# Patient Record
Sex: MALE | Race: WHITE | NOT HISPANIC OR LATINO | URBAN - NONMETROPOLITAN AREA
[De-identification: names, ages, dates, MRNs, and addresses within clinical notes are randomized per-mention and may not be internally consistent; named-entity substitution may affect disease eponyms.]

---

## 2021-09-09 ENCOUNTER — OFFICE (OUTPATIENT)
Dept: URBAN - NONMETROPOLITAN AREA CLINIC 13 | Facility: CLINIC | Age: 61
End: 2021-09-09

## 2021-09-09 VITALS
HEART RATE: 76 BPM | WEIGHT: 315 LBS | DIASTOLIC BLOOD PRESSURE: 76 MMHG | HEIGHT: 74 IN | OXYGEN SATURATION: 95 % | SYSTOLIC BLOOD PRESSURE: 166 MMHG | SYSTOLIC BLOOD PRESSURE: 176 MMHG

## 2021-09-09 DIAGNOSIS — Z86.010 PERSONAL HISTORY OF COLONIC POLYPS: ICD-10-CM

## 2021-09-09 DIAGNOSIS — D50.9 IRON DEFICIENCY ANEMIA, UNSPECIFIED: ICD-10-CM

## 2021-09-09 DIAGNOSIS — K21.9 GASTRO-ESOPHAGEAL REFLUX DISEASE WITHOUT ESOPHAGITIS: ICD-10-CM

## 2021-09-09 DIAGNOSIS — K92.1 MELENA: ICD-10-CM

## 2021-09-09 PROCEDURE — 99204 OFFICE O/P NEW MOD 45 MIN: CPT | Performed by: NURSE PRACTITIONER

## 2021-09-10 LAB
CBC WITH WBC (DIFF): ACANTHOCYTE: NORMAL
CBC WITH WBC (DIFF): AGRANULAR NEUTROPHIL: NORMAL
CBC WITH WBC (DIFF): ANISOCYTOSIS: NORMAL
CBC WITH WBC (DIFF): ATYPICAL LYMPHOCYTE: NORMAL
CBC WITH WBC (DIFF): AUER RODS: NORMAL
CBC WITH WBC (DIFF): BAND: NORMAL
CBC WITH WBC (DIFF): BASOPHIL: NORMAL
CBC WITH WBC (DIFF): BASOPHILIC STIPPLING: NORMAL
CBC WITH WBC (DIFF): BI-LOBED NEUTROPHIL: NORMAL
CBC WITH WBC (DIFF): BLAST: NORMAL
CBC WITH WBC (DIFF): BURR CELL: NORMAL
CBC WITH WBC (DIFF): DIMORPHIC RBC POPULATION: NORMAL
CBC WITH WBC (DIFF): DOHLE BODIES: NORMAL
CBC WITH WBC (DIFF): ELLIPTOCYTE: NORMAL
CBC WITH WBC (DIFF): EOSINOPHIL: NORMAL
CBC WITH WBC (DIFF): GIANT PLATELET: NORMAL
CBC WITH WBC (DIFF): HEMATOCRIT: 31.9 % — LOW (ref 41–53)
CBC WITH WBC (DIFF): HEMOGLOBIN: 8.2 G/DL — LOW (ref 14–18)
CBC WITH WBC (DIFF): HOWELL JOLLY BODIES: NORMAL
CBC WITH WBC (DIFF): HYPERSEGMENTED NEUTROPHIL: NORMAL
CBC WITH WBC (DIFF): HYPOCHROMIA: NORMAL
CBC WITH WBC (DIFF): HYPOGRANULAR NEUTROPHIL: NORMAL
CBC WITH WBC (DIFF): IMMATURE GRANULOCYTES: NORMAL
CBC WITH WBC (DIFF): LARGE PLATELET: NORMAL
CBC WITH WBC (DIFF): LYMPHOCYTE: NORMAL
CBC WITH WBC (DIFF): MACROCYTOSIS: NORMAL
CBC WITH WBC (DIFF): MEAN CELL HEMOGLOBIN CONCENTRATION: 25.7 G/DL — LOW (ref 33–37)
CBC WITH WBC (DIFF): MEAN CELL HEMOGLOBIN: 21.2 PG — LOW (ref 27–31)
CBC WITH WBC (DIFF): MEAN CELL VOLUME: 82 FL — LOW (ref 84–100)
CBC WITH WBC (DIFF): MEAN PLATELET VOLUME: 10.8 FL (ref 8.3–11.9)
CBC WITH WBC (DIFF): METAMYELOCYTE: NORMAL
CBC WITH WBC (DIFF): MICROCYTOSIS: NORMAL
CBC WITH WBC (DIFF): MIX CELLS: NORMAL
CBC WITH WBC (DIFF): MONOCYTE: NORMAL
CBC WITH WBC (DIFF): MYELOCYTE: NORMAL
CBC WITH WBC (DIFF): NEUTROPHIL SEGMENTED: NORMAL
CBC WITH WBC (DIFF): NOTE: NORMAL
CBC WITH WBC (DIFF): NUCLEATED RBC: 0 /100WBC (ref 0–0)
CBC WITH WBC (DIFF): OVALOCYTE: NORMAL
CBC WITH WBC (DIFF): PAPPENHEIMER BODIES: NORMAL
CBC WITH WBC (DIFF): PATHOLOGIST INTERPRETATION: NORMAL
CBC WITH WBC (DIFF): PLATELET CLUMPS: NORMAL
CBC WITH WBC (DIFF): PLATELET COUNT: 259 /NL (ref 140–440)
CBC WITH WBC (DIFF): PLT SATELLITOSIS: NORMAL
CBC WITH WBC (DIFF): POIKILOCYTOSIS: NORMAL
CBC WITH WBC (DIFF): POLYCHROMASIA: NORMAL
CBC WITH WBC (DIFF): PROMYELOCYTE: NORMAL
CBC WITH WBC (DIFF): RBC MORPHOLOGY: NORMAL
CBC WITH WBC (DIFF): REACT LYMPH ABS MAN: NORMAL
CBC WITH WBC (DIFF): REACTIVE LYMPHOCYTE: NORMAL
CBC WITH WBC (DIFF): RED BLOOD CELL: 3.87 /PL — LOW (ref 4.7–6.1)
CBC WITH WBC (DIFF): RED CELL DIAMETER WIDTH: 52 FL — HIGH (ref 35–48)
CBC WITH WBC (DIFF): ROULEAUX RBC: NORMAL
CBC WITH WBC (DIFF): SCHISTOCYTE: NORMAL
CBC WITH WBC (DIFF): SICKLE CELL: NORMAL
CBC WITH WBC (DIFF): SMUDGE CELLS: NORMAL
CBC WITH WBC (DIFF): SPHEROCYTE: NORMAL
CBC WITH WBC (DIFF): STOMATOCYTE: NORMAL
CBC WITH WBC (DIFF): TARGET CELL: NORMAL
CBC WITH WBC (DIFF): TEAR DROP CELL: NORMAL
CBC WITH WBC (DIFF): TOXIC GRANULATION: NORMAL
CBC WITH WBC (DIFF): UNCLASSIFIED CELL: NORMAL
CBC WITH WBC (DIFF): VACUOLATED NEUTROPHIL: NORMAL
CBC WITH WBC (DIFF): WBC MORPHOLOGY: NORMAL
CBC WITH WBC (DIFF): WHITE BLOOD CELL: 6.8 /NL (ref 4.8–11)
COMPREHENSIVE METABOLIC PANEL: ALBUMIN: 4.2 G/DL (ref 3.5–4.8)
COMPREHENSIVE METABOLIC PANEL: ALKALINE PHOSPHATASE: 65 UNITS/L (ref 36–126)
COMPREHENSIVE METABOLIC PANEL: ALT: 14 UNITS/L (ref ?–49)
COMPREHENSIVE METABOLIC PANEL: ANION GAP: 12 MMOL/L (ref 7–16)
COMPREHENSIVE METABOLIC PANEL: AST: 21 UNITS/L (ref 17–59)
COMPREHENSIVE METABOLIC PANEL: BILIRUBIN, TOTAL: 0.6 MG/DL (ref 0.2–1.3)
COMPREHENSIVE METABOLIC PANEL: BUN/CREATININE RATIO: 18.3
COMPREHENSIVE METABOLIC PANEL: CALCIUM: 9.9 MG/DL (ref 8.4–10.2)
COMPREHENSIVE METABOLIC PANEL: CARBON DIOXIDE: 28 MMOL/L (ref 22–30)
COMPREHENSIVE METABOLIC PANEL: CHLORIDE: 101 MMOL/L (ref 98–107)
COMPREHENSIVE METABOLIC PANEL: CREATININE: 0.82 MG/DL (ref 0.66–1.25)
COMPREHENSIVE METABOLIC PANEL: GLUCOSE: 113 MG/DL — HIGH (ref 75–110)
COMPREHENSIVE METABOLIC PANEL: POTASSIUM: 4.6 MMOL/L (ref 3.5–5.3)
COMPREHENSIVE METABOLIC PANEL: PROTEIN, TOTAL, SERUM: 6.7 G/DL (ref 6.3–8.2)
COMPREHENSIVE METABOLIC PANEL: SODIUM: 141 MMOL/L (ref 137–145)
COMPREHENSIVE METABOLIC PANEL: UREA NITROGEN (BUN): 15 MG/DL (ref 9–20)
FERRITIN: 4.8 NG/ML — LOW (ref 18–464)
GFR: EGFR AFRICAN AMERICAN: >60 ML/MIN/1.73M2
GFR: EGFR NON-AFR.AMERICAN: >60 ML/MIN/1.73M2
IRON AND TOTAL IRON BINDING CAPACITY: % SATURATION: 5 % — LOW (ref 11–40)
IRON AND TOTAL IRON BINDING CAPACITY: IRON BINDING CAPACITY: 519 MCG/DL — HIGH (ref 261–462)
IRON AND TOTAL IRON BINDING CAPACITY: IRON, TOTAL: 24 MCG/DL — LOW (ref 49–181)

## 2021-09-22 ENCOUNTER — AMBULATORY SURGICAL CENTER (OUTPATIENT)
Dept: URBAN - NONMETROPOLITAN AREA SURGERY 2 | Facility: SURGERY | Age: 61
End: 2021-09-22

## 2021-09-22 ENCOUNTER — OFFICE (OUTPATIENT)
Dept: URBAN - NONMETROPOLITAN AREA CLINIC 13 | Facility: CLINIC | Age: 61
End: 2021-09-22

## 2021-09-22 VITALS
HEART RATE: 85 BPM | HEART RATE: 84 BPM | RESPIRATION RATE: 17 BRPM | HEART RATE: 82 BPM | DIASTOLIC BLOOD PRESSURE: 87 MMHG | DIASTOLIC BLOOD PRESSURE: 74 MMHG | OXYGEN SATURATION: 94 % | WEIGHT: 315 LBS | OXYGEN SATURATION: 98 % | HEART RATE: 72 BPM | OXYGEN SATURATION: 97 % | DIASTOLIC BLOOD PRESSURE: 93 MMHG | RESPIRATION RATE: 20 BRPM | DIASTOLIC BLOOD PRESSURE: 118 MMHG | HEIGHT: 74 IN | DIASTOLIC BLOOD PRESSURE: 75 MMHG | HEART RATE: 88 BPM | SYSTOLIC BLOOD PRESSURE: 134 MMHG | TEMPERATURE: 98.2 F | DIASTOLIC BLOOD PRESSURE: 95 MMHG | SYSTOLIC BLOOD PRESSURE: 161 MMHG | HEART RATE: 81 BPM | SYSTOLIC BLOOD PRESSURE: 147 MMHG | SYSTOLIC BLOOD PRESSURE: 144 MMHG | SYSTOLIC BLOOD PRESSURE: 173 MMHG | SYSTOLIC BLOOD PRESSURE: 162 MMHG | RESPIRATION RATE: 19 BRPM | SYSTOLIC BLOOD PRESSURE: 188 MMHG

## 2021-09-22 DIAGNOSIS — K31.89 OTHER DISEASES OF STOMACH AND DUODENUM: ICD-10-CM

## 2021-09-22 DIAGNOSIS — D50.9 IRON DEFICIENCY ANEMIA, UNSPECIFIED: ICD-10-CM

## 2021-09-22 DIAGNOSIS — K31.9 DISEASE OF STOMACH AND DUODENUM, UNSPECIFIED: ICD-10-CM

## 2021-09-22 DIAGNOSIS — K21.9 GASTRO-ESOPHAGEAL REFLUX DISEASE WITHOUT ESOPHAGITIS: ICD-10-CM

## 2021-09-22 DIAGNOSIS — K29.50 UNSPECIFIED CHRONIC GASTRITIS WITHOUT BLEEDING: ICD-10-CM

## 2021-09-22 DIAGNOSIS — K30 FUNCTIONAL DYSPEPSIA: ICD-10-CM

## 2021-09-22 PROCEDURE — 43239 EGD BIOPSY SINGLE/MULTIPLE: CPT | Performed by: INTERNAL MEDICINE

## 2021-09-22 PROCEDURE — 00731 ANES UPR GI NDSC PX NOS: CPT | Mod: QS,QZ

## 2021-09-22 PROCEDURE — 88313 SPECIAL STAINS GROUP 2: CPT | Mod: TC | Performed by: INTERNAL MEDICINE

## 2021-09-22 PROCEDURE — 00731 ANES UPR GI NDSC PX NOS: CPT | Mod: QZ,QS

## 2021-09-22 PROCEDURE — 88305 TISSUE EXAM BY PATHOLOGIST: CPT | Mod: TC | Performed by: INTERNAL MEDICINE

## 2021-09-22 PROCEDURE — 88312 SPECIAL STAINS GROUP 1: CPT | Mod: TC | Performed by: INTERNAL MEDICINE

## 2021-09-22 RX ORDER — OMEPRAZOLE 40 MG/1
CAPSULE, DELAYED RELEASE ORAL
Qty: 90 | Refills: 2 | Status: ACTIVE
Start: 2021-09-22

## 2021-09-22 RX ORDER — FAMOTIDINE 40 MG/1
TABLET, FILM COATED ORAL
Qty: 180 | Refills: 2 | Status: ACTIVE
Start: 2021-09-22

## 2021-09-24 LAB
PATHOLOGY REPORT: APSREPORT: (no result) 1
PATHOLOGY REPORT: PDF: (no result) 1

## 2021-11-23 ENCOUNTER — OFFICE (OUTPATIENT)
Dept: URBAN - NONMETROPOLITAN AREA CLINIC 13 | Facility: CLINIC | Age: 61
End: 2021-11-23

## 2021-11-23 VITALS
SYSTOLIC BLOOD PRESSURE: 149 MMHG | WEIGHT: 315 LBS | OXYGEN SATURATION: 97 % | HEIGHT: 74 IN | HEART RATE: 85 BPM | DIASTOLIC BLOOD PRESSURE: 68 MMHG

## 2021-11-23 DIAGNOSIS — K21.9 GASTRO-ESOPHAGEAL REFLUX DISEASE WITHOUT ESOPHAGITIS: ICD-10-CM

## 2021-11-23 DIAGNOSIS — D50.9 IRON DEFICIENCY ANEMIA, UNSPECIFIED: ICD-10-CM

## 2021-11-23 LAB
CBC WITH WBC (DIFF): ACANTHOCYTE: NORMAL
CBC WITH WBC (DIFF): AGRANULAR NEUTROPHIL: NORMAL
CBC WITH WBC (DIFF): ANISOCYTOSIS: (no result)
CBC WITH WBC (DIFF): ATYPICAL LYMPHOCYTE: NORMAL
CBC WITH WBC (DIFF): AUER RODS: NORMAL
CBC WITH WBC (DIFF): BAND: NORMAL
CBC WITH WBC (DIFF): BASOPHIL: 1 % (ref 0–1)
CBC WITH WBC (DIFF): BASOPHILIC STIPPLING: NORMAL
CBC WITH WBC (DIFF): BI-LOBED NEUTROPHIL: NORMAL
CBC WITH WBC (DIFF): BLAST: NORMAL
CBC WITH WBC (DIFF): BURR CELL: NORMAL
CBC WITH WBC (DIFF): DIMORPHIC RBC POPULATION: NORMAL
CBC WITH WBC (DIFF): DOHLE BODIES: NORMAL
CBC WITH WBC (DIFF): ELLIPTOCYTE: PRESENT
CBC WITH WBC (DIFF): EOSINOPHIL: 3 % (ref 0–5)
CBC WITH WBC (DIFF): GIANT PLATELET: NORMAL
CBC WITH WBC (DIFF): HEMATOCRIT: 30.7 % — LOW (ref 41–53)
CBC WITH WBC (DIFF): HEMOGLOBIN: 7.9 G/DL — LOW (ref 14–18)
CBC WITH WBC (DIFF): HOWELL JOLLY BODIES: NORMAL
CBC WITH WBC (DIFF): HYPERSEGMENTED NEUTROPHIL: NORMAL
CBC WITH WBC (DIFF): HYPOCHROMIA: (no result)
CBC WITH WBC (DIFF): HYPOGRANULAR NEUTROPHIL: PRESENT
CBC WITH WBC (DIFF): IMMATURE GRANULOCYTES: NORMAL
CBC WITH WBC (DIFF): LARGE PLATELET: NORMAL
CBC WITH WBC (DIFF): LYMPHOCYTE: 19 % — LOW (ref 20–40)
CBC WITH WBC (DIFF): MACROCYTOSIS: NORMAL
CBC WITH WBC (DIFF): MEAN CELL HEMOGLOBIN CONCENTRATION: 25.7 G/DL — LOW (ref 33–37)
CBC WITH WBC (DIFF): MEAN CELL HEMOGLOBIN: 20.7 PG — LOW (ref 27–31)
CBC WITH WBC (DIFF): MEAN CELL VOLUME: 80 FL — LOW (ref 84–100)
CBC WITH WBC (DIFF): MEAN PLATELET VOLUME: 11 FL (ref 8.3–11.9)
CBC WITH WBC (DIFF): METAMYELOCYTE: NORMAL
CBC WITH WBC (DIFF): MICROCYTOSIS: PRESENT
CBC WITH WBC (DIFF): MIX CELLS: NORMAL
CBC WITH WBC (DIFF): MONOCYTE: 6 % (ref 1–10)
CBC WITH WBC (DIFF): MYELOCYTE: 1 % — HIGH (ref 0–0)
CBC WITH WBC (DIFF): NEUTROPHIL SEGMENTED: 70 % (ref 50–70)
CBC WITH WBC (DIFF): NOTE: NORMAL
CBC WITH WBC (DIFF): NUCLEATED RBC: 0 /100WBC (ref 0–0)
CBC WITH WBC (DIFF): OVALOCYTE: PRESENT
CBC WITH WBC (DIFF): PAPPENHEIMER BODIES: NORMAL
CBC WITH WBC (DIFF): PATHOLOGIST INTERPRETATION: NORMAL
CBC WITH WBC (DIFF): PLATELET CLUMPS: NORMAL
CBC WITH WBC (DIFF): PLATELET COUNT: 320 /NL (ref 140–440)
CBC WITH WBC (DIFF): PLT SATELLITOSIS: NORMAL
CBC WITH WBC (DIFF): POIKILOCYTOSIS: (no result)
CBC WITH WBC (DIFF): POLYCHROMASIA: (no result)
CBC WITH WBC (DIFF): PROMYELOCYTE: NORMAL
CBC WITH WBC (DIFF): RBC MORPHOLOGY: (no result)
CBC WITH WBC (DIFF): REACT LYMPH ABS MAN: NORMAL
CBC WITH WBC (DIFF): REACTIVE LYMPHOCYTE: NORMAL
CBC WITH WBC (DIFF): RED BLOOD CELL: 3.82 /PL — LOW (ref 4.7–6.1)
CBC WITH WBC (DIFF): RED CELL DIAMETER WIDTH: 53 FL — HIGH (ref 35–48)
CBC WITH WBC (DIFF): ROULEAUX RBC: NORMAL
CBC WITH WBC (DIFF): SCHISTOCYTE: NORMAL
CBC WITH WBC (DIFF): SICKLE CELL: NORMAL
CBC WITH WBC (DIFF): SMUDGE CELLS: NORMAL
CBC WITH WBC (DIFF): SPHEROCYTE: NORMAL
CBC WITH WBC (DIFF): STOMATOCYTE: NORMAL
CBC WITH WBC (DIFF): TARGET CELL: NORMAL
CBC WITH WBC (DIFF): TEAR DROP CELL: PRESENT
CBC WITH WBC (DIFF): TOXIC GRANULATION: NORMAL
CBC WITH WBC (DIFF): UNCLASSIFIED CELL: NORMAL
CBC WITH WBC (DIFF): VACUOLATED NEUTROPHIL: NORMAL
CBC WITH WBC (DIFF): WBC MORPHOLOGY: NORMAL
CBC WITH WBC (DIFF): WHITE BLOOD CELL: 6.9 /NL (ref 4.8–11)
FERRITIN: 5.2 NG/ML — LOW (ref 18–464)
IRON AND TOTAL IRON BINDING CAPACITY: % SATURATION: 4 % — LOW (ref 11–40)
IRON AND TOTAL IRON BINDING CAPACITY: IRON BINDING CAPACITY: 548 MCG/DL — HIGH (ref 261–462)
IRON AND TOTAL IRON BINDING CAPACITY: IRON, TOTAL: 20 MCG/DL — LOW (ref 49–181)

## 2021-11-23 PROCEDURE — 99213 OFFICE O/P EST LOW 20 MIN: CPT | Performed by: NURSE PRACTITIONER

## 2022-01-26 ENCOUNTER — OFFICE (OUTPATIENT)
Dept: URBAN - NONMETROPOLITAN AREA CLINIC 13 | Facility: CLINIC | Age: 62
End: 2022-01-26

## 2022-01-26 ENCOUNTER — AMBULATORY SURGICAL CENTER (OUTPATIENT)
Dept: URBAN - NONMETROPOLITAN AREA SURGERY 2 | Facility: SURGERY | Age: 62
End: 2022-01-26
Payer: COMMERCIAL

## 2022-01-26 VITALS
RESPIRATION RATE: 16 BRPM | SYSTOLIC BLOOD PRESSURE: 155 MMHG | SYSTOLIC BLOOD PRESSURE: 103 MMHG | HEART RATE: 65 BPM | OXYGEN SATURATION: 97 % | RESPIRATION RATE: 17 BRPM | SYSTOLIC BLOOD PRESSURE: 126 MMHG | HEART RATE: 73 BPM | HEART RATE: 66 BPM | OXYGEN SATURATION: 98 % | OXYGEN SATURATION: 100 % | DIASTOLIC BLOOD PRESSURE: 49 MMHG | HEART RATE: 71 BPM | TEMPERATURE: 99 F | DIASTOLIC BLOOD PRESSURE: 68 MMHG | HEART RATE: 64 BPM | DIASTOLIC BLOOD PRESSURE: 69 MMHG | OXYGEN SATURATION: 99 % | OXYGEN SATURATION: 95 % | SYSTOLIC BLOOD PRESSURE: 118 MMHG | HEIGHT: 74 IN | DIASTOLIC BLOOD PRESSURE: 47 MMHG | DIASTOLIC BLOOD PRESSURE: 45 MMHG | SYSTOLIC BLOOD PRESSURE: 133 MMHG | RESPIRATION RATE: 15 BRPM | HEART RATE: 68 BPM | DIASTOLIC BLOOD PRESSURE: 56 MMHG | DIASTOLIC BLOOD PRESSURE: 88 MMHG | RESPIRATION RATE: 18 BRPM | SYSTOLIC BLOOD PRESSURE: 107 MMHG | WEIGHT: 315 LBS | DIASTOLIC BLOOD PRESSURE: 83 MMHG | RESPIRATION RATE: 19 BRPM | SYSTOLIC BLOOD PRESSURE: 134 MMHG | SYSTOLIC BLOOD PRESSURE: 91 MMHG

## 2022-01-26 VITALS — HEIGHT: 74 IN

## 2022-01-26 DIAGNOSIS — K92.1 MELENA: ICD-10-CM

## 2022-01-26 DIAGNOSIS — Z86.010 PERSONAL HISTORY OF COLONIC POLYPS: ICD-10-CM

## 2022-01-26 DIAGNOSIS — D50.9 IRON DEFICIENCY ANEMIA, UNSPECIFIED: ICD-10-CM

## 2022-01-26 DIAGNOSIS — K21.9 GASTRO-ESOPHAGEAL REFLUX DISEASE WITHOUT ESOPHAGITIS: ICD-10-CM

## 2022-01-26 LAB
CBC WITH WBC (DIFF): ACANTHOCYTE: NORMAL
CBC WITH WBC (DIFF): AGRANULAR NEUTROPHIL: NORMAL
CBC WITH WBC (DIFF): ANISOCYTOSIS: NORMAL
CBC WITH WBC (DIFF): ATYPICAL LYMPHOCYTE: NORMAL
CBC WITH WBC (DIFF): AUER RODS: NORMAL
CBC WITH WBC (DIFF): BAND: 7 % (ref 0–15)
CBC WITH WBC (DIFF): BASOPHIL: 4 % — HIGH (ref 0–1)
CBC WITH WBC (DIFF): BASOPHILIC STIPPLING: NORMAL
CBC WITH WBC (DIFF): BI-LOBED NEUTROPHIL: NORMAL
CBC WITH WBC (DIFF): BLAST: NORMAL
CBC WITH WBC (DIFF): BURR CELL: NORMAL
CBC WITH WBC (DIFF): DIMORPHIC RBC POPULATION: NORMAL
CBC WITH WBC (DIFF): DOHLE BODIES: NORMAL
CBC WITH WBC (DIFF): ELLIPTOCYTE: NORMAL
CBC WITH WBC (DIFF): EOSINOPHIL: 5 % (ref 0–5)
CBC WITH WBC (DIFF): GIANT PLATELET: NORMAL
CBC WITH WBC (DIFF): HEMATOCRIT: 37.5 % — LOW (ref 41–53)
CBC WITH WBC (DIFF): HEMOGLOBIN: 10.2 G/DL — LOW (ref 14–18)
CBC WITH WBC (DIFF): HOWELL JOLLY BODIES: NORMAL
CBC WITH WBC (DIFF): HYPERSEGMENTED NEUTROPHIL: NORMAL
CBC WITH WBC (DIFF): HYPOCHROMIA: NORMAL
CBC WITH WBC (DIFF): HYPOGRANULAR NEUTROPHIL: NORMAL
CBC WITH WBC (DIFF): IMMATURE GRANULOCYTES: NORMAL
CBC WITH WBC (DIFF): LARGE PLATELET: PRESENT
CBC WITH WBC (DIFF): LYMPHOCYTE: 21 % (ref 20–40)
CBC WITH WBC (DIFF): MACROCYTOSIS: NORMAL
CBC WITH WBC (DIFF): MEAN CELL HEMOGLOBIN CONCENTRATION: 27.2 G/DL — LOW (ref 33–37)
CBC WITH WBC (DIFF): MEAN CELL HEMOGLOBIN: 25.3 PG — LOW (ref 27–31)
CBC WITH WBC (DIFF): MEAN CELL VOLUME: 93 FL (ref 84–100)
CBC WITH WBC (DIFF): MEAN PLATELET VOLUME: 10.8 FL (ref 8.3–11.9)
CBC WITH WBC (DIFF): METAMYELOCYTE: NORMAL
CBC WITH WBC (DIFF): MICROCYTOSIS: PRESENT
CBC WITH WBC (DIFF): MIX CELLS: NORMAL
CBC WITH WBC (DIFF): MONOCYTE: 5 % (ref 1–10)
CBC WITH WBC (DIFF): MYELOCYTE: NORMAL
CBC WITH WBC (DIFF): NEUTROPHIL SEGMENTED: 58 % (ref 50–70)
CBC WITH WBC (DIFF): NOTE: NORMAL
CBC WITH WBC (DIFF): NUCLEATED RBC: 0 /100WBC (ref 0–0)
CBC WITH WBC (DIFF): OVALOCYTE: PRESENT
CBC WITH WBC (DIFF): PAPPENHEIMER BODIES: NORMAL
CBC WITH WBC (DIFF): PATHOLOGIST INTERPRETATION: NORMAL
CBC WITH WBC (DIFF): PLATELET CLUMPS: NORMAL
CBC WITH WBC (DIFF): PLATELET COUNT: 244 /NL (ref 140–440)
CBC WITH WBC (DIFF): PLT SATELLITOSIS: NORMAL
CBC WITH WBC (DIFF): POIKILOCYTOSIS: NORMAL
CBC WITH WBC (DIFF): POLYCHROMASIA: NORMAL
CBC WITH WBC (DIFF): PROMYELOCYTE: NORMAL
CBC WITH WBC (DIFF): RBC MORPHOLOGY: (no result)
CBC WITH WBC (DIFF): REACT LYMPH ABS MAN: NORMAL
CBC WITH WBC (DIFF): REACTIVE LYMPHOCYTE: NORMAL
CBC WITH WBC (DIFF): RED BLOOD CELL: 4.03 /PL — LOW (ref 4.7–6.1)
CBC WITH WBC (DIFF): RED CELL DIAMETER WIDTH: 71 FL — HIGH (ref 35–48)
CBC WITH WBC (DIFF): ROULEAUX RBC: NORMAL
CBC WITH WBC (DIFF): SCHISTOCYTE: NORMAL
CBC WITH WBC (DIFF): SICKLE CELL: NORMAL
CBC WITH WBC (DIFF): SMUDGE CELLS: NORMAL
CBC WITH WBC (DIFF): SPHEROCYTE: NORMAL
CBC WITH WBC (DIFF): STOMATOCYTE: NORMAL
CBC WITH WBC (DIFF): TARGET CELL: NORMAL
CBC WITH WBC (DIFF): TEAR DROP CELL: PRESENT
CBC WITH WBC (DIFF): TOXIC GRANULATION: NORMAL
CBC WITH WBC (DIFF): UNCLASSIFIED CELL: NORMAL
CBC WITH WBC (DIFF): VACUOLATED NEUTROPHIL: NORMAL
CBC WITH WBC (DIFF): WBC MORPHOLOGY: NORMAL
CBC WITH WBC (DIFF): WHITE BLOOD CELL: 5.4 /NL (ref 4.8–11)
FERRITIN: 32.8 NG/ML (ref 18–464)
IRON AND TOTAL IRON BINDING CAPACITY: % SATURATION: 8 % — LOW (ref 11–40)
IRON AND TOTAL IRON BINDING CAPACITY: IRON BINDING CAPACITY: 399 MCG/DL (ref 261–462)
IRON AND TOTAL IRON BINDING CAPACITY: IRON, TOTAL: 32 MCG/DL — LOW (ref 49–181)

## 2022-01-26 PROCEDURE — 82274 ASSAY TEST FOR BLOOD FECAL: CPT | Mod: QW | Performed by: INTERNAL MEDICINE

## 2022-01-26 PROCEDURE — 45378 DIAGNOSTIC COLONOSCOPY: CPT | Mod: 53 | Performed by: INTERNAL MEDICINE

## 2022-01-26 RX ORDER — POLYETHYLENE GLYCOL 3350 17 G/17G
POWDER, FOR SOLUTION ORAL
Qty: 3060 | Refills: 2 | Status: COMPLETED
Start: 2022-01-26 | End: 2022-03-09

## 2022-01-26 RX ORDER — DOCUSATE SODIUM AND SENNOSIDES 50; 8.6 MG/1; MG/1
17.2 TABLET, FILM COATED ORAL
Qty: 60 | Refills: 3 | Status: ACTIVE
Start: 2022-01-26

## 2022-03-09 ENCOUNTER — OFFICE (OUTPATIENT)
Dept: URBAN - NONMETROPOLITAN AREA CLINIC 13 | Facility: CLINIC | Age: 62
End: 2022-03-09

## 2022-03-09 VITALS
SYSTOLIC BLOOD PRESSURE: 121 MMHG | OXYGEN SATURATION: 95 % | DIASTOLIC BLOOD PRESSURE: 66 MMHG | WEIGHT: 315 LBS | HEIGHT: 74 IN | HEART RATE: 94 BPM

## 2022-03-09 DIAGNOSIS — D50.9 IRON DEFICIENCY ANEMIA, UNSPECIFIED: ICD-10-CM

## 2022-03-09 DIAGNOSIS — K21.9 GASTRO-ESOPHAGEAL REFLUX DISEASE WITHOUT ESOPHAGITIS: ICD-10-CM

## 2022-03-09 PROCEDURE — 99213 OFFICE O/P EST LOW 20 MIN: CPT | Performed by: NURSE PRACTITIONER

## 2022-03-09 RX ORDER — OMEPRAZOLE 40 MG/1
CAPSULE, DELAYED RELEASE ORAL
Qty: 90 | Refills: 2 | Status: ACTIVE
Start: 2021-09-22

## 2022-03-09 RX ORDER — FAMOTIDINE 40 MG/1
TABLET, FILM COATED ORAL
Qty: 180 | Refills: 2 | Status: ACTIVE
Start: 2021-09-22

## 2022-09-09 ENCOUNTER — OFFICE (OUTPATIENT)
Dept: URBAN - NONMETROPOLITAN AREA CLINIC 13 | Facility: CLINIC | Age: 62
End: 2022-09-09
Payer: COMMERCIAL

## 2022-09-09 VITALS
SYSTOLIC BLOOD PRESSURE: 88 MMHG | WEIGHT: 315 LBS | HEIGHT: 74 IN | DIASTOLIC BLOOD PRESSURE: 53 MMHG | OXYGEN SATURATION: 96 % | HEART RATE: 96 BPM

## 2022-09-09 DIAGNOSIS — K21.9 GASTRO-ESOPHAGEAL REFLUX DISEASE WITHOUT ESOPHAGITIS: ICD-10-CM

## 2022-09-09 DIAGNOSIS — D50.9 IRON DEFICIENCY ANEMIA, UNSPECIFIED: ICD-10-CM

## 2022-09-09 LAB
CBC WITH WBC (DIFF): ACANTHOCYTE: NORMAL
CBC WITH WBC (DIFF): AGRANULAR NEUTROPHIL: NORMAL
CBC WITH WBC (DIFF): ANISOCYTOSIS: NORMAL
CBC WITH WBC (DIFF): ATYPICAL LYMPHOCYTE: NORMAL
CBC WITH WBC (DIFF): AUER RODS: NORMAL
CBC WITH WBC (DIFF): BAND: NORMAL
CBC WITH WBC (DIFF): BASOPHIL: 2 % — HIGH (ref 0–1)
CBC WITH WBC (DIFF): BASOPHILIC STIPPLING: NORMAL
CBC WITH WBC (DIFF): BI-LOBED NEUTROPHIL: NORMAL
CBC WITH WBC (DIFF): BLAST: NORMAL
CBC WITH WBC (DIFF): BURR CELL: NORMAL
CBC WITH WBC (DIFF): DIMORPHIC RBC POPULATION: NORMAL
CBC WITH WBC (DIFF): DOHLE BODIES: NORMAL
CBC WITH WBC (DIFF): ELLIPTOCYTE: NORMAL
CBC WITH WBC (DIFF): EOSINOPHIL: 3 % (ref 0–5)
CBC WITH WBC (DIFF): GIANT PLATELET: NORMAL
CBC WITH WBC (DIFF): HEMATOCRIT: 31.6 % — LOW (ref 41–53)
CBC WITH WBC (DIFF): HEMOGLOBIN: 8.1 G/DL — LOW (ref 14–18)
CBC WITH WBC (DIFF): HOWELL JOLLY BODIES: NORMAL
CBC WITH WBC (DIFF): HYPERSEGMENTED NEUTROPHIL: NORMAL
CBC WITH WBC (DIFF): HYPOCHROMIA: NORMAL
CBC WITH WBC (DIFF): HYPOGRANULAR NEUTROPHIL: NORMAL
CBC WITH WBC (DIFF): IMMATURE GRANULOCYTES: 0 % (ref 0–1)
CBC WITH WBC (DIFF): LARGE PLATELET: NORMAL
CBC WITH WBC (DIFF): LYMPHOCYTE: 15 % — LOW (ref 20–40)
CBC WITH WBC (DIFF): MACROCYTOSIS: NORMAL
CBC WITH WBC (DIFF): MEAN CELL HEMOGLOBIN CONCENTRATION: 25.6 G/DL — LOW (ref 33–37)
CBC WITH WBC (DIFF): MEAN CELL HEMOGLOBIN: 22.6 PG — LOW (ref 27–31)
CBC WITH WBC (DIFF): MEAN CELL VOLUME: 88 FL (ref 84–100)
CBC WITH WBC (DIFF): MEAN PLATELET VOLUME: 11 FL (ref 8.3–11.9)
CBC WITH WBC (DIFF): METAMYELOCYTE: NORMAL
CBC WITH WBC (DIFF): MICROCYTOSIS: NORMAL
CBC WITH WBC (DIFF): MIX CELLS: NORMAL
CBC WITH WBC (DIFF): MONOCYTE: 10 % (ref 1–10)
CBC WITH WBC (DIFF): MYELOCYTE: NORMAL
CBC WITH WBC (DIFF): NEUTROPHIL SEGMENTED: 70 % (ref 50–70)
CBC WITH WBC (DIFF): NOTE: NORMAL
CBC WITH WBC (DIFF): NUCLEATED RBC: 0 /100WBC (ref 0–0)
CBC WITH WBC (DIFF): OVALOCYTE: NORMAL
CBC WITH WBC (DIFF): PAPPENHEIMER BODIES: NORMAL
CBC WITH WBC (DIFF): PATHOLOGIST INTERPRETATION: NORMAL
CBC WITH WBC (DIFF): PLATELET CLUMPS: NORMAL
CBC WITH WBC (DIFF): PLATELET COUNT: 379 /NL (ref 140–440)
CBC WITH WBC (DIFF): PLT SATELLITOSIS: NORMAL
CBC WITH WBC (DIFF): POIKILOCYTOSIS: NORMAL
CBC WITH WBC (DIFF): POLYCHROMASIA: NORMAL
CBC WITH WBC (DIFF): PROMYELOCYTE: NORMAL
CBC WITH WBC (DIFF): RBC MORPHOLOGY: NORMAL
CBC WITH WBC (DIFF): REACT LYMPH ABS MAN: NORMAL
CBC WITH WBC (DIFF): REACTIVE LYMPHOCYTE: NORMAL
CBC WITH WBC (DIFF): RED BLOOD CELL: 3.58 /PL — LOW (ref 4.7–6.1)
CBC WITH WBC (DIFF): RED CELL DIAMETER WIDTH: 56 FL — HIGH (ref 35–48)
CBC WITH WBC (DIFF): ROULEAUX RBC: NORMAL
CBC WITH WBC (DIFF): SCHISTOCYTE: NORMAL
CBC WITH WBC (DIFF): SICKLE CELL: NORMAL
CBC WITH WBC (DIFF): SMUDGE CELLS: NORMAL
CBC WITH WBC (DIFF): SPHEROCYTE: NORMAL
CBC WITH WBC (DIFF): STOMATOCYTE: NORMAL
CBC WITH WBC (DIFF): TARGET CELL: NORMAL
CBC WITH WBC (DIFF): TEAR DROP CELL: NORMAL
CBC WITH WBC (DIFF): TOXIC GRANULATION: NORMAL
CBC WITH WBC (DIFF): UNCLASSIFIED CELL: NORMAL
CBC WITH WBC (DIFF): VACUOLATED NEUTROPHIL: NORMAL
CBC WITH WBC (DIFF): WBC MORPHOLOGY: NORMAL
CBC WITH WBC (DIFF): WHITE BLOOD CELL: 9.6 /NL (ref 4.8–11)
FERRITIN: 14.1 NG/ML — LOW (ref 18–464)
IRON AND TOTAL IRON BINDING CAPACITY: % SATURATION: 3 % — LOW (ref 11–40)
IRON AND TOTAL IRON BINDING CAPACITY: IRON BINDING CAPACITY: 548 MCG/DL — HIGH (ref 261–462)
IRON AND TOTAL IRON BINDING CAPACITY: IRON, TOTAL: 18 MCG/DL — LOW (ref 49–181)

## 2022-09-09 PROCEDURE — 99213 OFFICE O/P EST LOW 20 MIN: CPT | Performed by: NURSE PRACTITIONER

## 2022-09-09 RX ORDER — FAMOTIDINE 40 MG/1
TABLET, FILM COATED ORAL
Qty: 180 | Refills: 2 | Status: ACTIVE
Start: 2021-09-22

## 2022-09-09 RX ORDER — OMEPRAZOLE 40 MG/1
CAPSULE, DELAYED RELEASE ORAL
Qty: 90 | Refills: 2 | Status: ACTIVE
Start: 2021-09-22

## 2022-10-05 ENCOUNTER — OFFICE (OUTPATIENT)
Dept: URBAN - NONMETROPOLITAN AREA CLINIC 13 | Facility: CLINIC | Age: 62
End: 2022-10-05
Payer: COMMERCIAL

## 2022-10-05 ENCOUNTER — AMBULATORY SURGICAL CENTER (OUTPATIENT)
Dept: URBAN - NONMETROPOLITAN AREA SURGERY 2 | Facility: SURGERY | Age: 62
End: 2022-10-05
Payer: COMMERCIAL

## 2022-10-05 VITALS
DIASTOLIC BLOOD PRESSURE: 71 MMHG | RESPIRATION RATE: 20 BRPM | DIASTOLIC BLOOD PRESSURE: 100 MMHG | SYSTOLIC BLOOD PRESSURE: 175 MMHG | HEART RATE: 78 BPM | OXYGEN SATURATION: 100 % | SYSTOLIC BLOOD PRESSURE: 131 MMHG | DIASTOLIC BLOOD PRESSURE: 76 MMHG | HEIGHT: 74 IN | HEART RATE: 82 BPM | HEART RATE: 76 BPM | SYSTOLIC BLOOD PRESSURE: 170 MMHG | HEART RATE: 77 BPM | RESPIRATION RATE: 22 BRPM | RESPIRATION RATE: 28 BRPM | DIASTOLIC BLOOD PRESSURE: 86 MMHG | DIASTOLIC BLOOD PRESSURE: 97 MMHG | OXYGEN SATURATION: 97 % | SYSTOLIC BLOOD PRESSURE: 190 MMHG | TEMPERATURE: 98.3 F | WEIGHT: 315 LBS | HEART RATE: 83 BPM | SYSTOLIC BLOOD PRESSURE: 137 MMHG | SYSTOLIC BLOOD PRESSURE: 186 MMHG | HEART RATE: 74 BPM | HEART RATE: 73 BPM | OXYGEN SATURATION: 98 % | DIASTOLIC BLOOD PRESSURE: 87 MMHG | OXYGEN SATURATION: 96 % | RESPIRATION RATE: 16 BRPM

## 2022-10-05 DIAGNOSIS — K21.9 GASTRO-ESOPHAGEAL REFLUX DISEASE WITHOUT ESOPHAGITIS: ICD-10-CM

## 2022-10-05 DIAGNOSIS — K31.9 DISEASE OF STOMACH AND DUODENUM, UNSPECIFIED: ICD-10-CM

## 2022-10-05 DIAGNOSIS — D50.9 IRON DEFICIENCY ANEMIA, UNSPECIFIED: ICD-10-CM

## 2022-10-05 DIAGNOSIS — K31.89 OTHER DISEASES OF STOMACH AND DUODENUM: ICD-10-CM

## 2022-10-05 PROCEDURE — 88313 SPECIAL STAINS GROUP 2: CPT | Mod: TC | Performed by: INTERNAL MEDICINE

## 2022-10-05 PROCEDURE — 88305 TISSUE EXAM BY PATHOLOGIST: CPT | Mod: TC | Performed by: INTERNAL MEDICINE

## 2022-10-05 PROCEDURE — 43239 EGD BIOPSY SINGLE/MULTIPLE: CPT | Performed by: INTERNAL MEDICINE

## 2022-10-05 PROCEDURE — 88312 SPECIAL STAINS GROUP 1: CPT | Mod: TC | Performed by: INTERNAL MEDICINE

## 2022-10-05 RX ORDER — OMEPRAZOLE 40 MG/1
CAPSULE, DELAYED RELEASE ORAL
Qty: 90 | Refills: 2 | Status: ACTIVE
Start: 2021-09-22

## 2022-10-05 RX ORDER — FAMOTIDINE 40 MG/1
TABLET, FILM COATED ORAL
Qty: 180 | Refills: 2 | Status: ACTIVE
Start: 2021-09-22

## 2022-10-10 LAB
PATHOLOGY REPORT: APSREPORT: (no result) 1
PATHOLOGY REPORT: PDF: (no result) 1

## 2022-12-06 ENCOUNTER — OFFICE (OUTPATIENT)
Dept: URBAN - NONMETROPOLITAN AREA CLINIC 13 | Facility: CLINIC | Age: 62
End: 2022-12-06
Payer: COMMERCIAL

## 2022-12-06 VITALS
OXYGEN SATURATION: 95 % | DIASTOLIC BLOOD PRESSURE: 80 MMHG | SYSTOLIC BLOOD PRESSURE: 146 MMHG | HEART RATE: 74 BPM | WEIGHT: 315 LBS | HEIGHT: 74 IN

## 2022-12-06 DIAGNOSIS — K59.00 CONSTIPATION, UNSPECIFIED: ICD-10-CM

## 2022-12-06 DIAGNOSIS — K21.9 GASTRO-ESOPHAGEAL REFLUX DISEASE WITHOUT ESOPHAGITIS: ICD-10-CM

## 2022-12-06 PROCEDURE — 99213 OFFICE O/P EST LOW 20 MIN: CPT | Performed by: NURSE PRACTITIONER

## 2022-12-06 RX ORDER — DOCUSATE SODIUM AND SENNOSIDES 50; 8.6 MG/1; MG/1
17.2 TABLET, FILM COATED ORAL
Qty: 60 | Refills: 3 | Status: ACTIVE
Start: 2022-01-26

## 2022-12-06 RX ORDER — OMEPRAZOLE 40 MG/1
CAPSULE, DELAYED RELEASE ORAL
Qty: 90 | Refills: 2 | Status: ACTIVE
Start: 2021-09-22

## 2022-12-06 RX ORDER — FAMOTIDINE 40 MG/1
TABLET, FILM COATED ORAL
Qty: 180 | Refills: 2 | Status: ACTIVE
Start: 2021-09-22

## 2023-01-20 ENCOUNTER — OFFICE (OUTPATIENT)
Dept: URBAN - NONMETROPOLITAN AREA CLINIC 13 | Facility: CLINIC | Age: 63
End: 2023-01-20

## 2023-01-20 VITALS
DIASTOLIC BLOOD PRESSURE: 76 MMHG | HEART RATE: 95 BPM | SYSTOLIC BLOOD PRESSURE: 155 MMHG | DIASTOLIC BLOOD PRESSURE: 71 MMHG | SYSTOLIC BLOOD PRESSURE: 158 MMHG | OXYGEN SATURATION: 95 % | HEIGHT: 74 IN | WEIGHT: 315 LBS

## 2023-01-20 DIAGNOSIS — K21.9 GASTRO-ESOPHAGEAL REFLUX DISEASE WITHOUT ESOPHAGITIS: ICD-10-CM

## 2023-01-20 DIAGNOSIS — K92.1 MELENA: ICD-10-CM

## 2023-01-20 DIAGNOSIS — D50.9 IRON DEFICIENCY ANEMIA, UNSPECIFIED: ICD-10-CM

## 2023-01-20 PROCEDURE — 99213 OFFICE O/P EST LOW 20 MIN: CPT | Performed by: NURSE PRACTITIONER

## 2023-01-23 ENCOUNTER — AMBULATORY SURGICAL CENTER (OUTPATIENT)
Dept: URBAN - NONMETROPOLITAN AREA SURGERY 2 | Facility: SURGERY | Age: 63
End: 2023-01-23

## 2023-01-23 VITALS
OXYGEN SATURATION: 99 % | DIASTOLIC BLOOD PRESSURE: 70 MMHG | SYSTOLIC BLOOD PRESSURE: 123 MMHG | DIASTOLIC BLOOD PRESSURE: 65 MMHG | OXYGEN SATURATION: 100 % | HEART RATE: 80 BPM | SYSTOLIC BLOOD PRESSURE: 115 MMHG | WEIGHT: 315 LBS | RESPIRATION RATE: 8 BRPM | RESPIRATION RATE: 18 BRPM | TEMPERATURE: 98.3 F | SYSTOLIC BLOOD PRESSURE: 145 MMHG | HEART RATE: 79 BPM | DIASTOLIC BLOOD PRESSURE: 67 MMHG | SYSTOLIC BLOOD PRESSURE: 131 MMHG | HEART RATE: 82 BPM | RESPIRATION RATE: 9 BRPM | SYSTOLIC BLOOD PRESSURE: 132 MMHG | HEART RATE: 83 BPM | OXYGEN SATURATION: 96 % | DIASTOLIC BLOOD PRESSURE: 66 MMHG | OXYGEN SATURATION: 94 % | RESPIRATION RATE: 20 BRPM | SYSTOLIC BLOOD PRESSURE: 120 MMHG | HEIGHT: 74 IN | DIASTOLIC BLOOD PRESSURE: 63 MMHG | RESPIRATION RATE: 17 BRPM

## 2023-01-23 DIAGNOSIS — Z79.899 OTHER LONG TERM (CURRENT) DRUG THERAPY: ICD-10-CM

## 2023-01-23 DIAGNOSIS — K21.9 GASTRO-ESOPHAGEAL REFLUX DISEASE WITHOUT ESOPHAGITIS: ICD-10-CM

## 2023-01-23 DIAGNOSIS — K31.89 OTHER DISEASES OF STOMACH AND DUODENUM: ICD-10-CM

## 2023-01-23 DIAGNOSIS — D50.9 IRON DEFICIENCY ANEMIA, UNSPECIFIED: ICD-10-CM

## 2023-01-23 LAB
CBC WITH WBC (DIFF): ACANTHOCYTE: NORMAL
CBC WITH WBC (DIFF): AGRANULAR NEUTROPHIL: NORMAL
CBC WITH WBC (DIFF): ANISOCYTOSIS: (no result)
CBC WITH WBC (DIFF): ATYPICAL LYMPHOCYTE: NORMAL
CBC WITH WBC (DIFF): AUER RODS: NORMAL
CBC WITH WBC (DIFF): BAND: NORMAL
CBC WITH WBC (DIFF): BASOPHIL: 2 % — HIGH (ref 0–1)
CBC WITH WBC (DIFF): BASOPHILIC STIPPLING: NORMAL
CBC WITH WBC (DIFF): BI-LOBED NEUTROPHIL: NORMAL
CBC WITH WBC (DIFF): BLAST: NORMAL
CBC WITH WBC (DIFF): BURR CELL: NORMAL
CBC WITH WBC (DIFF): DIMORPHIC RBC POPULATION: PRESENT
CBC WITH WBC (DIFF): DOHLE BODIES: NORMAL
CBC WITH WBC (DIFF): ELLIPTOCYTE: NORMAL
CBC WITH WBC (DIFF): EOSINOPHIL: 4 % (ref 0–5)
CBC WITH WBC (DIFF): GIANT PLATELET: NORMAL
CBC WITH WBC (DIFF): HEMATOCRIT: 27.8 % — LOW (ref 41–53)
CBC WITH WBC (DIFF): HEMOGLOBIN: 7.6 G/DL — LOW (ref 14–18)
CBC WITH WBC (DIFF): HOWELL JOLLY BODIES: NORMAL
CBC WITH WBC (DIFF): HYPERSEGMENTED NEUTROPHIL: NORMAL
CBC WITH WBC (DIFF): HYPOCHROMIA: (no result)
CBC WITH WBC (DIFF): HYPOGRANULAR NEUTROPHIL: NORMAL
CBC WITH WBC (DIFF): IMMATURE GRANULOCYTES: NORMAL
CBC WITH WBC (DIFF): LARGE PLATELET: NORMAL
CBC WITH WBC (DIFF): LYMPHOCYTE: 11 % — LOW (ref 20–40)
CBC WITH WBC (DIFF): MACROCYTOSIS: NORMAL
CBC WITH WBC (DIFF): MEAN CELL HEMOGLOBIN CONCENTRATION: 27.3 G/DL — LOW (ref 33–37)
CBC WITH WBC (DIFF): MEAN CELL HEMOGLOBIN: 23.8 PG — LOW (ref 27–31)
CBC WITH WBC (DIFF): MEAN CELL VOLUME: 87 FL (ref 84–100)
CBC WITH WBC (DIFF): MEAN PLATELET VOLUME: 10.9 FL (ref 8.3–11.9)
CBC WITH WBC (DIFF): METAMYELOCYTE: NORMAL
CBC WITH WBC (DIFF): MICROCYTOSIS: PRESENT
CBC WITH WBC (DIFF): MIX CELLS: NORMAL
CBC WITH WBC (DIFF): MONOCYTE: 5 % (ref 1–10)
CBC WITH WBC (DIFF): MYELOCYTE: 1 % — HIGH (ref 0–0)
CBC WITH WBC (DIFF): NEUTROPHIL SEGMENTED: 78 % — HIGH (ref 50–70)
CBC WITH WBC (DIFF): NOTE: NORMAL
CBC WITH WBC (DIFF): NUCLEATED RBC: 1 /100WBC — HIGH (ref 0–0)
CBC WITH WBC (DIFF): OVALOCYTE: NORMAL
CBC WITH WBC (DIFF): PAPPENHEIMER BODIES: NORMAL
CBC WITH WBC (DIFF): PATHOLOGIST INTERPRETATION: NORMAL
CBC WITH WBC (DIFF): PLATELET CLUMPS: NORMAL
CBC WITH WBC (DIFF): PLATELET COUNT: 345 /NL (ref 140–440)
CBC WITH WBC (DIFF): PLT SATELLITOSIS: NORMAL
CBC WITH WBC (DIFF): POIKILOCYTOSIS: NORMAL
CBC WITH WBC (DIFF): POLYCHROMASIA: (no result)
CBC WITH WBC (DIFF): PROMYELOCYTE: NORMAL
CBC WITH WBC (DIFF): RBC MORPHOLOGY: (no result)
CBC WITH WBC (DIFF): REACT LYMPH ABS MAN: NORMAL
CBC WITH WBC (DIFF): REACTIVE LYMPHOCYTE: NORMAL
CBC WITH WBC (DIFF): RED BLOOD CELL: 3.2 /PL — LOW (ref 4.7–6.1)
CBC WITH WBC (DIFF): RED CELL DIAMETER WIDTH: 60 FL — HIGH (ref 35–48)
CBC WITH WBC (DIFF): ROULEAUX RBC: NORMAL
CBC WITH WBC (DIFF): SCHISTOCYTE: NORMAL
CBC WITH WBC (DIFF): SICKLE CELL: NORMAL
CBC WITH WBC (DIFF): SMUDGE CELLS: NORMAL
CBC WITH WBC (DIFF): SPHEROCYTE: NORMAL
CBC WITH WBC (DIFF): STOMATOCYTE: NORMAL
CBC WITH WBC (DIFF): TARGET CELL: NORMAL
CBC WITH WBC (DIFF): TEAR DROP CELL: NORMAL
CBC WITH WBC (DIFF): TOXIC GRANULATION: NORMAL
CBC WITH WBC (DIFF): UNCLASSIFIED CELL: NORMAL
CBC WITH WBC (DIFF): VACUOLATED NEUTROPHIL: NORMAL
CBC WITH WBC (DIFF): WBC MORPHOLOGY: NORMAL
CBC WITH WBC (DIFF): WHITE BLOOD CELL: 9.1 /NL (ref 4.8–11)

## 2023-01-23 PROCEDURE — 43235 EGD DIAGNOSTIC BRUSH WASH: CPT | Performed by: INTERNAL MEDICINE

## 2023-01-23 RX ORDER — POLYETHYLENE GLYCOL 3350 17 G/17G
POWDER, FOR SOLUTION ORAL
Qty: 1020 | Refills: 2 | Status: COMPLETED
Start: 2023-01-23 | End: 2023-06-08

## 2023-02-03 ENCOUNTER — ON CAMPUS - OUTPATIENT (OUTPATIENT)
Dept: URBAN - NONMETROPOLITAN AREA HOSPITAL 34 | Facility: HOSPITAL | Age: 63
End: 2023-02-03

## 2023-02-03 DIAGNOSIS — Q27.33 ARTERIOVENOUS MALFORMATION OF DIGESTIVE SYSTEM VESSEL: ICD-10-CM

## 2023-02-03 DIAGNOSIS — D50.9 IRON DEFICIENCY ANEMIA, UNSPECIFIED: ICD-10-CM

## 2023-02-03 DIAGNOSIS — K92.2 GASTROINTESTINAL HEMORRHAGE, UNSPECIFIED: ICD-10-CM

## 2023-02-03 PROCEDURE — 91110 GI TRC IMG INTRAL ESOPH-ILE: CPT | Mod: 26 | Performed by: INTERNAL MEDICINE

## 2023-03-29 ENCOUNTER — ON CAMPUS - OUTPATIENT (OUTPATIENT)
Dept: URBAN - NONMETROPOLITAN AREA HOSPITAL 34 | Facility: HOSPITAL | Age: 63
End: 2023-03-29

## 2023-03-29 DIAGNOSIS — Q27.33 ARTERIOVENOUS MALFORMATION OF DIGESTIVE SYSTEM VESSEL: ICD-10-CM

## 2023-03-29 PROCEDURE — 44366 SMALL BOWEL ENDOSCOPY: CPT | Performed by: INTERNAL MEDICINE

## 2023-06-08 ENCOUNTER — OFFICE (OUTPATIENT)
Dept: URBAN - NONMETROPOLITAN AREA CLINIC 13 | Facility: CLINIC | Age: 63
End: 2023-06-08
Payer: COMMERCIAL

## 2023-06-08 VITALS
DIASTOLIC BLOOD PRESSURE: 64 MMHG | SYSTOLIC BLOOD PRESSURE: 111 MMHG | HEIGHT: 74 IN | WEIGHT: 315 LBS | OXYGEN SATURATION: 98 % | HEART RATE: 86 BPM

## 2023-06-08 DIAGNOSIS — D50.9 IRON DEFICIENCY ANEMIA, UNSPECIFIED: ICD-10-CM

## 2023-06-08 DIAGNOSIS — K92.1 MELENA: ICD-10-CM

## 2023-06-08 LAB
CBC WITH WBC (DIFF): ACANTHOCYTE: NORMAL
CBC WITH WBC (DIFF): AGRANULAR NEUTROPHIL: NORMAL
CBC WITH WBC (DIFF): ANISOCYTOSIS: (no result)
CBC WITH WBC (DIFF): ATYPICAL LYMPHOCYTE: NORMAL
CBC WITH WBC (DIFF): AUER RODS: NORMAL
CBC WITH WBC (DIFF): BAND: NORMAL
CBC WITH WBC (DIFF): BASOPHIL: 4 % — HIGH (ref 0–1)
CBC WITH WBC (DIFF): BASOPHILIC STIPPLING: NORMAL
CBC WITH WBC (DIFF): BI-LOBED NEUTROPHIL: NORMAL
CBC WITH WBC (DIFF): BLAST: NORMAL
CBC WITH WBC (DIFF): BURR CELL: NORMAL
CBC WITH WBC (DIFF): DIMORPHIC RBC POPULATION: NORMAL
CBC WITH WBC (DIFF): DOHLE BODIES: NORMAL
CBC WITH WBC (DIFF): ELLIPTOCYTE: NORMAL
CBC WITH WBC (DIFF): EOSINOPHIL: 3 % (ref 0–5)
CBC WITH WBC (DIFF): GIANT PLATELET: NORMAL
CBC WITH WBC (DIFF): HEMATOCRIT: 27.6 % — LOW (ref 41–53)
CBC WITH WBC (DIFF): HEMOGLOBIN: 7.1 G/DL — LOW (ref 14–18)
CBC WITH WBC (DIFF): HOWELL JOLLY BODIES: NORMAL
CBC WITH WBC (DIFF): HYPERSEGMENTED NEUTROPHIL: NORMAL
CBC WITH WBC (DIFF): HYPOCHROMIA: NORMAL
CBC WITH WBC (DIFF): HYPOGRANULAR NEUTROPHIL: NORMAL
CBC WITH WBC (DIFF): IMMATURE GRANULOCYTES: NORMAL
CBC WITH WBC (DIFF): LARGE PLATELET: NORMAL
CBC WITH WBC (DIFF): LYMPHOCYTE: 12 % — LOW (ref 20–40)
CBC WITH WBC (DIFF): MACROCYTOSIS: NORMAL
CBC WITH WBC (DIFF): MEAN CELL HEMOGLOBIN CONCENTRATION: 25.7 G/DL — LOW (ref 33–37)
CBC WITH WBC (DIFF): MEAN CELL HEMOGLOBIN: 20.8 PG — LOW (ref 27–31)
CBC WITH WBC (DIFF): MEAN CELL VOLUME: 81 FL — LOW (ref 84–100)
CBC WITH WBC (DIFF): MEAN PLATELET VOLUME: 10.8 FL (ref 8.3–11.9)
CBC WITH WBC (DIFF): METAMYELOCYTE: NORMAL
CBC WITH WBC (DIFF): MICROCYTOSIS: PRESENT
CBC WITH WBC (DIFF): MIX CELLS: NORMAL
CBC WITH WBC (DIFF): MONOCYTE: 3 % (ref 1–10)
CBC WITH WBC (DIFF): MYELOCYTE: NORMAL
CBC WITH WBC (DIFF): NEUTROPHIL SEGMENTED: 77 % — HIGH (ref 50–70)
CBC WITH WBC (DIFF): NOTE: NORMAL
CBC WITH WBC (DIFF): NUCLEATED RBC: 0 /100WBC (ref 0–0)
CBC WITH WBC (DIFF): OVALOCYTE: PRESENT
CBC WITH WBC (DIFF): PAPPENHEIMER BODIES: NORMAL
CBC WITH WBC (DIFF): PATHOLOGIST INTERPRETATION: NORMAL
CBC WITH WBC (DIFF): PLATELET CLUMPS: NORMAL
CBC WITH WBC (DIFF): PLATELET COUNT: 287 /NL (ref 140–440)
CBC WITH WBC (DIFF): PLT SATELLITOSIS: NORMAL
CBC WITH WBC (DIFF): POIKILOCYTOSIS: (no result)
CBC WITH WBC (DIFF): POLYCHROMASIA: (no result)
CBC WITH WBC (DIFF): PROMYELOCYTE: NORMAL
CBC WITH WBC (DIFF): RBC MORPHOLOGY: (no result)
CBC WITH WBC (DIFF): REACT LYMPH ABS MAN: NORMAL
CBC WITH WBC (DIFF): REACTIVE LYMPHOCYTE: NORMAL
CBC WITH WBC (DIFF): RED BLOOD CELL: 3.41 /PL — LOW (ref 4.7–6.1)
CBC WITH WBC (DIFF): RED CELL DIAMETER WIDTH: 60 FL — HIGH (ref 35–48)
CBC WITH WBC (DIFF): ROULEAUX RBC: NORMAL
CBC WITH WBC (DIFF): SCHISTOCYTE: NORMAL
CBC WITH WBC (DIFF): SICKLE CELL: NORMAL
CBC WITH WBC (DIFF): SMUDGE CELLS: NORMAL
CBC WITH WBC (DIFF): SPHEROCYTE: NORMAL
CBC WITH WBC (DIFF): STOMATOCYTE: NORMAL
CBC WITH WBC (DIFF): TARGET CELL: NORMAL
CBC WITH WBC (DIFF): TEAR DROP CELL: NORMAL
CBC WITH WBC (DIFF): TOXIC GRANULATION: NORMAL
CBC WITH WBC (DIFF): UNCLASSIFIED CELL: NORMAL
CBC WITH WBC (DIFF): VACUOLATED NEUTROPHIL: NORMAL
CBC WITH WBC (DIFF): WBC MORPHOLOGY: NORMAL
CBC WITH WBC (DIFF): WHITE BLOOD CELL: 7.3 /NL (ref 4.8–11)
FERRITIN: 4.9 NG/ML — LOW (ref 18–464)
IRON AND TOTAL IRON BINDING CAPACITY: % SATURATION: 3 % — LOW (ref 11–40)
IRON AND TOTAL IRON BINDING CAPACITY: IRON BINDING CAPACITY: 529 MCG/DL — HIGH (ref 261–462)
IRON AND TOTAL IRON BINDING CAPACITY: IRON, TOTAL: 18 MCG/DL — LOW (ref 49–181)

## 2023-06-08 PROCEDURE — 99213 OFFICE O/P EST LOW 20 MIN: CPT | Performed by: NURSE PRACTITIONER

## 2023-06-14 ENCOUNTER — AMBULATORY SURGICAL CENTER (OUTPATIENT)
Dept: URBAN - NONMETROPOLITAN AREA SURGERY 2 | Facility: SURGERY | Age: 63
End: 2023-06-14
Payer: COMMERCIAL

## 2023-06-14 VITALS
OXYGEN SATURATION: 94 % | DIASTOLIC BLOOD PRESSURE: 74 MMHG | SYSTOLIC BLOOD PRESSURE: 146 MMHG | DIASTOLIC BLOOD PRESSURE: 80 MMHG | HEART RATE: 69 BPM | WEIGHT: 315 LBS | RESPIRATION RATE: 10 BRPM | SYSTOLIC BLOOD PRESSURE: 153 MMHG | SYSTOLIC BLOOD PRESSURE: 158 MMHG | SYSTOLIC BLOOD PRESSURE: 140 MMHG | SYSTOLIC BLOOD PRESSURE: 151 MMHG | DIASTOLIC BLOOD PRESSURE: 75 MMHG | RESPIRATION RATE: 19 BRPM | SYSTOLIC BLOOD PRESSURE: 169 MMHG | HEART RATE: 79 BPM | DIASTOLIC BLOOD PRESSURE: 60 MMHG | RESPIRATION RATE: 14 BRPM | OXYGEN SATURATION: 97 % | HEIGHT: 74 IN | DIASTOLIC BLOOD PRESSURE: 69 MMHG | HEART RATE: 72 BPM | TEMPERATURE: 98.7 F | DIASTOLIC BLOOD PRESSURE: 82 MMHG | DIASTOLIC BLOOD PRESSURE: 72 MMHG | RESPIRATION RATE: 15 BRPM | OXYGEN SATURATION: 99 % | SYSTOLIC BLOOD PRESSURE: 149 MMHG | RESPIRATION RATE: 18 BRPM | OXYGEN SATURATION: 95 % | HEART RATE: 78 BPM | RESPIRATION RATE: 17 BRPM | OXYGEN SATURATION: 96 % | HEART RATE: 75 BPM | DIASTOLIC BLOOD PRESSURE: 67 MMHG | HEART RATE: 81 BPM | OXYGEN SATURATION: 98 % | SYSTOLIC BLOOD PRESSURE: 150 MMHG

## 2023-06-14 DIAGNOSIS — K92.2 GASTROINTESTINAL HEMORRHAGE, UNSPECIFIED: ICD-10-CM

## 2023-06-14 DIAGNOSIS — K31.89 OTHER DISEASES OF STOMACH AND DUODENUM: ICD-10-CM

## 2023-06-14 DIAGNOSIS — D50.9 IRON DEFICIENCY ANEMIA, UNSPECIFIED: ICD-10-CM

## 2023-06-14 DIAGNOSIS — K55.20 ANGIODYSPLASIA OF COLON WITHOUT HEMORRHAGE: ICD-10-CM

## 2023-06-14 LAB
CBC WITH WBC (DIFF): ACANTHOCYTE: NORMAL
CBC WITH WBC (DIFF): AGRANULAR NEUTROPHIL: NORMAL
CBC WITH WBC (DIFF): ANISOCYTOSIS: NORMAL
CBC WITH WBC (DIFF): ATYPICAL LYMPHOCYTE: NORMAL
CBC WITH WBC (DIFF): AUER RODS: NORMAL
CBC WITH WBC (DIFF): BAND: NORMAL
CBC WITH WBC (DIFF): BASOPHIL: 1 % (ref 0–1)
CBC WITH WBC (DIFF): BASOPHILIC STIPPLING: NORMAL
CBC WITH WBC (DIFF): BI-LOBED NEUTROPHIL: NORMAL
CBC WITH WBC (DIFF): BLAST: NORMAL
CBC WITH WBC (DIFF): BURR CELL: NORMAL
CBC WITH WBC (DIFF): DIMORPHIC RBC POPULATION: NORMAL
CBC WITH WBC (DIFF): DOHLE BODIES: NORMAL
CBC WITH WBC (DIFF): ELLIPTOCYTE: NORMAL
CBC WITH WBC (DIFF): EOSINOPHIL: 5 % (ref 0–5)
CBC WITH WBC (DIFF): GIANT PLATELET: NORMAL
CBC WITH WBC (DIFF): HEMATOCRIT: 29.6 % — LOW (ref 41–53)
CBC WITH WBC (DIFF): HEMOGLOBIN: 7.8 G/DL — LOW (ref 14–18)
CBC WITH WBC (DIFF): HOWELL JOLLY BODIES: NORMAL
CBC WITH WBC (DIFF): HYPERSEGMENTED NEUTROPHIL: NORMAL
CBC WITH WBC (DIFF): HYPOCHROMIA: NORMAL
CBC WITH WBC (DIFF): HYPOGRANULAR NEUTROPHIL: NORMAL
CBC WITH WBC (DIFF): IMMATURE GRANULOCYTES: 0 % (ref 0–1)
CBC WITH WBC (DIFF): LARGE PLATELET: NORMAL
CBC WITH WBC (DIFF): LYMPHOCYTE: 15 % — LOW (ref 20–40)
CBC WITH WBC (DIFF): MACROCYTOSIS: NORMAL
CBC WITH WBC (DIFF): MEAN CELL HEMOGLOBIN CONCENTRATION: 26.4 G/DL — LOW (ref 33–37)
CBC WITH WBC (DIFF): MEAN CELL HEMOGLOBIN: 21.5 PG — LOW (ref 27–31)
CBC WITH WBC (DIFF): MEAN CELL VOLUME: 82 FL — LOW (ref 84–100)
CBC WITH WBC (DIFF): MEAN PLATELET VOLUME: 11.4 FL (ref 8.3–11.9)
CBC WITH WBC (DIFF): METAMYELOCYTE: NORMAL
CBC WITH WBC (DIFF): MICROCYTOSIS: NORMAL
CBC WITH WBC (DIFF): MIX CELLS: NORMAL
CBC WITH WBC (DIFF): MONOCYTE: 10 % (ref 1–10)
CBC WITH WBC (DIFF): MYELOCYTE: NORMAL
CBC WITH WBC (DIFF): NEUTROPHIL SEGMENTED: 69 % (ref 50–70)
CBC WITH WBC (DIFF): NOTE: NORMAL
CBC WITH WBC (DIFF): NUCLEATED RBC: 0 /100WBC (ref 0–0)
CBC WITH WBC (DIFF): OVALOCYTE: NORMAL
CBC WITH WBC (DIFF): PAPPENHEIMER BODIES: NORMAL
CBC WITH WBC (DIFF): PATHOLOGIST INTERPRETATION: NORMAL
CBC WITH WBC (DIFF): PLATELET CLUMPS: NORMAL
CBC WITH WBC (DIFF): PLATELET COUNT: 234 /NL (ref 140–440)
CBC WITH WBC (DIFF): PLT SATELLITOSIS: NORMAL
CBC WITH WBC (DIFF): POIKILOCYTOSIS: NORMAL
CBC WITH WBC (DIFF): POLYCHROMASIA: NORMAL
CBC WITH WBC (DIFF): PROMYELOCYTE: NORMAL
CBC WITH WBC (DIFF): RBC MORPHOLOGY: NORMAL
CBC WITH WBC (DIFF): REACT LYMPH ABS MAN: NORMAL
CBC WITH WBC (DIFF): REACTIVE LYMPHOCYTE: NORMAL
CBC WITH WBC (DIFF): RED BLOOD CELL: 3.62 /PL — LOW (ref 4.7–6.1)
CBC WITH WBC (DIFF): RED CELL DIAMETER WIDTH: 59 FL — HIGH (ref 35–48)
CBC WITH WBC (DIFF): ROULEAUX RBC: NORMAL
CBC WITH WBC (DIFF): SCHISTOCYTE: NORMAL
CBC WITH WBC (DIFF): SICKLE CELL: NORMAL
CBC WITH WBC (DIFF): SMUDGE CELLS: NORMAL
CBC WITH WBC (DIFF): SPHEROCYTE: NORMAL
CBC WITH WBC (DIFF): STOMATOCYTE: NORMAL
CBC WITH WBC (DIFF): TARGET CELL: NORMAL
CBC WITH WBC (DIFF): TEAR DROP CELL: NORMAL
CBC WITH WBC (DIFF): TOXIC GRANULATION: NORMAL
CBC WITH WBC (DIFF): UNCLASSIFIED CELL: NORMAL
CBC WITH WBC (DIFF): VACUOLATED NEUTROPHIL: NORMAL
CBC WITH WBC (DIFF): WBC MORPHOLOGY: NORMAL
CBC WITH WBC (DIFF): WHITE BLOOD CELL: 6.1 /NL (ref 4.8–11)

## 2023-06-14 PROCEDURE — 44360 SMALL BOWEL ENDOSCOPY: CPT | Performed by: INTERNAL MEDICINE

## 2023-06-14 RX ORDER — FAMOTIDINE 40 MG/1
TABLET, FILM COATED ORAL
Qty: 180 | Refills: 2 | Status: ACTIVE
Start: 2021-09-22

## 2023-06-14 RX ORDER — OMEPRAZOLE 40 MG/1
CAPSULE, DELAYED RELEASE ORAL
Qty: 90 | Refills: 2 | Status: ACTIVE
Start: 2021-09-22

## 2023-06-14 NOTE — SERVICENOTES
GIOVANNI- no airway needed, non-supine recovery
EKG Rhythm:
NSR


Mallampati Score:
2

Post-Procedure Anesthesia Review:
 Patient tolerated procedure well, stable to recovery

## 2023-07-14 ENCOUNTER — OFFICE (OUTPATIENT)
Dept: URBAN - NONMETROPOLITAN AREA CLINIC 13 | Facility: CLINIC | Age: 63
End: 2023-07-14
Payer: COMMERCIAL

## 2023-07-14 VITALS
OXYGEN SATURATION: 95 % | DIASTOLIC BLOOD PRESSURE: 70 MMHG | HEART RATE: 99 BPM | HEIGHT: 74 IN | SYSTOLIC BLOOD PRESSURE: 130 MMHG | WEIGHT: 315 LBS

## 2023-07-14 DIAGNOSIS — K21.9 GASTRO-ESOPHAGEAL REFLUX DISEASE WITHOUT ESOPHAGITIS: ICD-10-CM

## 2023-07-14 DIAGNOSIS — D50.9 IRON DEFICIENCY ANEMIA, UNSPECIFIED: ICD-10-CM

## 2023-07-14 LAB
CBC WITH WBC (DIFF): ACANTHOCYTE: NORMAL
CBC WITH WBC (DIFF): AGRANULAR NEUTROPHIL: NORMAL
CBC WITH WBC (DIFF): ANISOCYTOSIS: (no result)
CBC WITH WBC (DIFF): ATYPICAL LYMPHOCYTE: NORMAL
CBC WITH WBC (DIFF): AUER RODS: NORMAL
CBC WITH WBC (DIFF): BAND: NORMAL
CBC WITH WBC (DIFF): BASOPHIL: 2 % — HIGH (ref 0–1)
CBC WITH WBC (DIFF): BASOPHILIC STIPPLING: NORMAL
CBC WITH WBC (DIFF): BI-LOBED NEUTROPHIL: NORMAL
CBC WITH WBC (DIFF): BLAST: NORMAL
CBC WITH WBC (DIFF): BURR CELL: NORMAL
CBC WITH WBC (DIFF): DIMORPHIC RBC POPULATION: NORMAL
CBC WITH WBC (DIFF): DOHLE BODIES: NORMAL
CBC WITH WBC (DIFF): ELLIPTOCYTE: PRESENT
CBC WITH WBC (DIFF): EOSINOPHIL: 5 % (ref 0–5)
CBC WITH WBC (DIFF): GIANT PLATELET: NORMAL
CBC WITH WBC (DIFF): HEMATOCRIT: 34.9 % — LOW (ref 41–53)
CBC WITH WBC (DIFF): HEMOGLOBIN: 9.8 G/DL — LOW (ref 14–18)
CBC WITH WBC (DIFF): HOWELL JOLLY BODIES: NORMAL
CBC WITH WBC (DIFF): HYPERSEGMENTED NEUTROPHIL: NORMAL
CBC WITH WBC (DIFF): HYPOCHROMIA: (no result)
CBC WITH WBC (DIFF): HYPOGRANULAR NEUTROPHIL: NORMAL
CBC WITH WBC (DIFF): IMMATURE GRANULOCYTES: NORMAL
CBC WITH WBC (DIFF): LARGE PLATELET: PRESENT
CBC WITH WBC (DIFF): LYMPHOCYTE: 8 % — LOW (ref 20–40)
CBC WITH WBC (DIFF): MACROCYTOSIS: PRESENT
CBC WITH WBC (DIFF): MEAN CELL HEMOGLOBIN CONCENTRATION: 28.1 G/DL — LOW (ref 33–37)
CBC WITH WBC (DIFF): MEAN CELL HEMOGLOBIN: 25.3 PG — LOW (ref 27–31)
CBC WITH WBC (DIFF): MEAN CELL VOLUME: 90 FL (ref 84–100)
CBC WITH WBC (DIFF): MEAN PLATELET VOLUME: 11.1 FL (ref 8.3–11.9)
CBC WITH WBC (DIFF): METAMYELOCYTE: NORMAL
CBC WITH WBC (DIFF): MICROCYTOSIS: PRESENT
CBC WITH WBC (DIFF): MIX CELLS: NORMAL
CBC WITH WBC (DIFF): MONOCYTE: 8 % (ref 1–10)
CBC WITH WBC (DIFF): MYELOCYTE: NORMAL
CBC WITH WBC (DIFF): NEUTROPHIL SEGMENTED: 78 % — HIGH (ref 50–70)
CBC WITH WBC (DIFF): NOTE: NORMAL
CBC WITH WBC (DIFF): NUCLEATED RBC: 0 /100WBC (ref 0–0)
CBC WITH WBC (DIFF): OVALOCYTE: PRESENT
CBC WITH WBC (DIFF): PAPPENHEIMER BODIES: NORMAL
CBC WITH WBC (DIFF): PATHOLOGIST INTERPRETATION: NORMAL
CBC WITH WBC (DIFF): PLATELET CLUMPS: NORMAL
CBC WITH WBC (DIFF): PLATELET COUNT: 344 /NL (ref 140–440)
CBC WITH WBC (DIFF): PLT SATELLITOSIS: NORMAL
CBC WITH WBC (DIFF): POIKILOCYTOSIS: (no result)
CBC WITH WBC (DIFF): POLYCHROMASIA: (no result)
CBC WITH WBC (DIFF): PROMYELOCYTE: NORMAL
CBC WITH WBC (DIFF): RBC MORPHOLOGY: (no result)
CBC WITH WBC (DIFF): REACT LYMPH ABS MAN: NORMAL
CBC WITH WBC (DIFF): REACTIVE LYMPHOCYTE: NORMAL
CBC WITH WBC (DIFF): RED BLOOD CELL: 3.87 /PL — LOW (ref 4.7–6.1)
CBC WITH WBC (DIFF): RED CELL DIAMETER WIDTH: 82 FL — HIGH (ref 35–48)
CBC WITH WBC (DIFF): ROULEAUX RBC: NORMAL
CBC WITH WBC (DIFF): SCHISTOCYTE: NORMAL
CBC WITH WBC (DIFF): SICKLE CELL: NORMAL
CBC WITH WBC (DIFF): SMUDGE CELLS: NORMAL
CBC WITH WBC (DIFF): SPHEROCYTE: NORMAL
CBC WITH WBC (DIFF): STOMATOCYTE: PRESENT
CBC WITH WBC (DIFF): TARGET CELL: NORMAL
CBC WITH WBC (DIFF): TEAR DROP CELL: NORMAL
CBC WITH WBC (DIFF): TOXIC GRANULATION: NORMAL
CBC WITH WBC (DIFF): UNCLASSIFIED CELL: NORMAL
CBC WITH WBC (DIFF): VACUOLATED NEUTROPHIL: NORMAL
CBC WITH WBC (DIFF): WBC MORPHOLOGY: NORMAL
CBC WITH WBC (DIFF): WHITE BLOOD CELL: 9.9 /NL (ref 4.8–11)
COMPREHENSIVE METABOLIC PANEL: ALBUMIN: 4.9 G/DL — HIGH (ref 3.5–4.8)
COMPREHENSIVE METABOLIC PANEL: ALKALINE PHOSPHATASE: 66 UNITS/L (ref 36–126)
COMPREHENSIVE METABOLIC PANEL: ALT: 17 UNITS/L (ref ?–49)
COMPREHENSIVE METABOLIC PANEL: ANION GAP: 13 MMOL/L (ref 7–16)
COMPREHENSIVE METABOLIC PANEL: AST: 24 UNITS/L (ref 17–59)
COMPREHENSIVE METABOLIC PANEL: BILIRUBIN, TOTAL: 0.8 MG/DL (ref 0.2–1.3)
COMPREHENSIVE METABOLIC PANEL: BUN/CREATININE RATIO: 21.1
COMPREHENSIVE METABOLIC PANEL: CALCIUM: 9.7 MG/DL (ref 8.4–10.2)
COMPREHENSIVE METABOLIC PANEL: CARBON DIOXIDE: 26 MMOL/L (ref 22–30)
COMPREHENSIVE METABOLIC PANEL: CHLORIDE: 100 MMOL/L (ref 98–107)
COMPREHENSIVE METABOLIC PANEL: CREATININE: 1.14 MG/DL (ref 0.66–1.25)
COMPREHENSIVE METABOLIC PANEL: GLUCOSE: 185 MG/DL — HIGH (ref 75–110)
COMPREHENSIVE METABOLIC PANEL: POTASSIUM: 5 MMOL/L (ref 3.5–5.3)
COMPREHENSIVE METABOLIC PANEL: PROTEIN, TOTAL, SERUM: 7.4 G/DL (ref 6.3–8.2)
COMPREHENSIVE METABOLIC PANEL: SODIUM: 139 MMOL/L (ref 137–145)
COMPREHENSIVE METABOLIC PANEL: UREA NITROGEN (BUN): 24 MG/DL — HIGH (ref 9–20)
FERRITIN: 71.7 NG/ML (ref 18–464)
GFR: EGFR AFRICAN AMERICAN: >60 ML/MIN/1.73M2
GFR: EGFR NON-AFR.AMERICAN: >60 ML/MIN/1.73M2
IRON AND TOTAL IRON BINDING CAPACITY: % SATURATION: 11 % (ref 11–40)
IRON AND TOTAL IRON BINDING CAPACITY: IRON BINDING CAPACITY: 508 MCG/DL — HIGH (ref 261–462)
IRON AND TOTAL IRON BINDING CAPACITY: IRON, TOTAL: 55 MCG/DL (ref 49–181)

## 2023-07-14 PROCEDURE — 99213 OFFICE O/P EST LOW 20 MIN: CPT | Performed by: NURSE PRACTITIONER

## 2023-08-08 ENCOUNTER — AMBULATORY SURGICAL CENTER (OUTPATIENT)
Dept: URBAN - NONMETROPOLITAN AREA SURGERY 2 | Facility: SURGERY | Age: 63
End: 2023-08-08

## 2023-08-08 VITALS
SYSTOLIC BLOOD PRESSURE: 105 MMHG | SYSTOLIC BLOOD PRESSURE: 118 MMHG | RESPIRATION RATE: 18 BRPM | DIASTOLIC BLOOD PRESSURE: 43 MMHG | SYSTOLIC BLOOD PRESSURE: 133 MMHG | HEART RATE: 82 BPM | HEART RATE: 76 BPM | HEIGHT: 74 IN | DIASTOLIC BLOOD PRESSURE: 69 MMHG | RESPIRATION RATE: 17 BRPM | WEIGHT: 315 LBS | DIASTOLIC BLOOD PRESSURE: 58 MMHG | SYSTOLIC BLOOD PRESSURE: 123 MMHG | DIASTOLIC BLOOD PRESSURE: 74 MMHG | SYSTOLIC BLOOD PRESSURE: 138 MMHG | DIASTOLIC BLOOD PRESSURE: 73 MMHG | HEART RATE: 81 BPM | TEMPERATURE: 98.3 F | OXYGEN SATURATION: 97 % | HEART RATE: 80 BPM | SYSTOLIC BLOOD PRESSURE: 140 MMHG | HEART RATE: 79 BPM | RESPIRATION RATE: 15 BRPM | DIASTOLIC BLOOD PRESSURE: 76 MMHG

## 2023-08-08 DIAGNOSIS — Z79.899 OTHER LONG TERM (CURRENT) DRUG THERAPY: ICD-10-CM

## 2023-08-08 DIAGNOSIS — K55.20 ANGIODYSPLASIA OF COLON WITHOUT HEMORRHAGE: ICD-10-CM

## 2023-08-08 DIAGNOSIS — T18.2XXA FOREIGN BODY IN STOMACH, INITIAL ENCOUNTER: ICD-10-CM

## 2023-08-08 DIAGNOSIS — K21.9 GASTRO-ESOPHAGEAL REFLUX DISEASE WITHOUT ESOPHAGITIS: ICD-10-CM

## 2023-08-08 DIAGNOSIS — D50.9 IRON DEFICIENCY ANEMIA, UNSPECIFIED: ICD-10-CM

## 2023-08-08 PROCEDURE — 43235 EGD DIAGNOSTIC BRUSH WASH: CPT | Performed by: INTERNAL MEDICINE

## 2023-08-08 NOTE — SERVICENOTES
GIOVANNI, doesn't use CPAP. Pt takes Ozempic and did not hold. Food in stomach. Non supine recovery.

## 2023-08-08 NOTE — SERVICEHPINOTES
Patient presents today for Endoscopy. Since last office visit patient reports change in medical history including: Required blood transfusion last Monday 7/31/23.

## 2023-08-17 ENCOUNTER — AMBULATORY SURGICAL CENTER (OUTPATIENT)
Dept: URBAN - NONMETROPOLITAN AREA SURGERY 2 | Facility: SURGERY | Age: 63
End: 2023-08-17

## 2023-08-17 VITALS
OXYGEN SATURATION: 96 % | SYSTOLIC BLOOD PRESSURE: 152 MMHG | OXYGEN SATURATION: 98 % | RESPIRATION RATE: 16 BRPM | HEART RATE: 80 BPM | HEART RATE: 83 BPM | DIASTOLIC BLOOD PRESSURE: 75 MMHG | SYSTOLIC BLOOD PRESSURE: 108 MMHG | SYSTOLIC BLOOD PRESSURE: 131 MMHG | RESPIRATION RATE: 13 BRPM | SYSTOLIC BLOOD PRESSURE: 122 MMHG | TEMPERATURE: 98.5 F | HEART RATE: 84 BPM | SYSTOLIC BLOOD PRESSURE: 140 MMHG | HEART RATE: 79 BPM | RESPIRATION RATE: 10 BRPM | DIASTOLIC BLOOD PRESSURE: 63 MMHG | DIASTOLIC BLOOD PRESSURE: 69 MMHG | HEART RATE: 75 BPM | WEIGHT: 315 LBS | OXYGEN SATURATION: 97 % | SYSTOLIC BLOOD PRESSURE: 142 MMHG | OXYGEN SATURATION: 94 % | RESPIRATION RATE: 19 BRPM | DIASTOLIC BLOOD PRESSURE: 70 MMHG | DIASTOLIC BLOOD PRESSURE: 57 MMHG | HEART RATE: 81 BPM | OXYGEN SATURATION: 99 % | DIASTOLIC BLOOD PRESSURE: 50 MMHG | SYSTOLIC BLOOD PRESSURE: 128 MMHG | DIASTOLIC BLOOD PRESSURE: 66 MMHG | DIASTOLIC BLOOD PRESSURE: 72 MMHG | SYSTOLIC BLOOD PRESSURE: 143 MMHG | HEIGHT: 74 IN

## 2023-08-17 DIAGNOSIS — K31.89 OTHER DISEASES OF STOMACH AND DUODENUM: ICD-10-CM

## 2023-08-17 DIAGNOSIS — K21.9 GASTRO-ESOPHAGEAL REFLUX DISEASE WITHOUT ESOPHAGITIS: ICD-10-CM

## 2023-08-17 DIAGNOSIS — Z79.899 OTHER LONG TERM (CURRENT) DRUG THERAPY: ICD-10-CM

## 2023-08-17 DIAGNOSIS — D50.9 IRON DEFICIENCY ANEMIA, UNSPECIFIED: ICD-10-CM

## 2023-08-17 DIAGNOSIS — K29.70 GASTRITIS, UNSPECIFIED, WITHOUT BLEEDING: ICD-10-CM

## 2023-08-17 PROCEDURE — 43255 EGD CONTROL BLEEDING ANY: CPT | Mod: 59 | Performed by: INTERNAL MEDICINE

## 2023-08-17 PROCEDURE — 43239 EGD BIOPSY SINGLE/MULTIPLE: CPT | Performed by: INTERNAL MEDICINE

## 2023-08-17 RX ORDER — OMEPRAZOLE 40 MG/1
CAPSULE, DELAYED RELEASE ORAL
Qty: 90 | Refills: 2 | Status: ACTIVE
Start: 2021-09-22

## 2023-08-17 RX ORDER — FAMOTIDINE 40 MG/1
TABLET, FILM COATED ORAL
Qty: 180 | Refills: 2 | Status: ACTIVE
Start: 2021-09-22

## 2023-08-17 RX ADMIN — DEXMEDETOMIDINE HYDROCHLORIDE 25 MCG: 100 INJECTION, SOLUTION, CONCENTRATE INTRAVENOUS at 17:43

## 2023-08-17 NOTE — SERVICEHPINOTES
Patient presents today for EGD with small bowel enteroscopy. Since last office visit patient denies: Hospitalization, ER visit, Surgeries/Procedures, or any other changes in medical history. Medications reconciled in pre op (See Pre op note).

## 2023-09-19 ENCOUNTER — OFFICE (OUTPATIENT)
Dept: URBAN - NONMETROPOLITAN AREA CLINIC 13 | Facility: CLINIC | Age: 63
End: 2023-09-19

## 2023-09-19 VITALS
HEART RATE: 106 BPM | OXYGEN SATURATION: 96 % | DIASTOLIC BLOOD PRESSURE: 63 MMHG | WEIGHT: 315 LBS | HEIGHT: 74 IN | SYSTOLIC BLOOD PRESSURE: 104 MMHG

## 2023-09-19 DIAGNOSIS — D50.9 IRON DEFICIENCY ANEMIA, UNSPECIFIED: ICD-10-CM

## 2023-09-19 DIAGNOSIS — K21.9 GASTRO-ESOPHAGEAL REFLUX DISEASE WITHOUT ESOPHAGITIS: ICD-10-CM

## 2023-09-19 DIAGNOSIS — K31.84 GASTROPARESIS: ICD-10-CM

## 2023-09-19 PROCEDURE — 99213 OFFICE O/P EST LOW 20 MIN: CPT | Performed by: NURSE PRACTITIONER

## 2023-09-20 LAB
CBC WITH WBC (DIFF): ACANTHOCYTE: NORMAL
CBC WITH WBC (DIFF): AGRANULAR NEUTROPHIL: NORMAL
CBC WITH WBC (DIFF): ANISOCYTOSIS: (no result)
CBC WITH WBC (DIFF): ATYPICAL LYMPHOCYTE: NORMAL
CBC WITH WBC (DIFF): AUER RODS: NORMAL
CBC WITH WBC (DIFF): BAND: 5 % (ref 0–15)
CBC WITH WBC (DIFF): BASOPHIL: 3 % — HIGH (ref 0–1)
CBC WITH WBC (DIFF): BASOPHILIC STIPPLING: NORMAL
CBC WITH WBC (DIFF): BI-LOBED NEUTROPHIL: NORMAL
CBC WITH WBC (DIFF): BLAST: NORMAL
CBC WITH WBC (DIFF): BURR CELL: NORMAL
CBC WITH WBC (DIFF): DIMORPHIC RBC POPULATION: NORMAL
CBC WITH WBC (DIFF): DOHLE BODIES: NORMAL
CBC WITH WBC (DIFF): ELLIPTOCYTE: NORMAL
CBC WITH WBC (DIFF): EOSINOPHIL: 8 % — HIGH (ref 0–5)
CBC WITH WBC (DIFF): GIANT PLATELET: NORMAL
CBC WITH WBC (DIFF): HEMATOCRIT: 30.9 % — LOW (ref 41–53)
CBC WITH WBC (DIFF): HEMOGLOBIN: 8.1 G/DL — LOW (ref 14–18)
CBC WITH WBC (DIFF): HOWELL JOLLY BODIES: NORMAL
CBC WITH WBC (DIFF): HYPERSEGMENTED NEUTROPHIL: NORMAL
CBC WITH WBC (DIFF): HYPOCHROMIA: NORMAL
CBC WITH WBC (DIFF): HYPOGRANULAR NEUTROPHIL: NORMAL
CBC WITH WBC (DIFF): IMMATURE GRANULOCYTES: NORMAL
CBC WITH WBC (DIFF): LARGE PLATELET: NORMAL
CBC WITH WBC (DIFF): LYMPHOCYTE: 10 % — LOW (ref 20–40)
CBC WITH WBC (DIFF): MACROCYTOSIS: NORMAL
CBC WITH WBC (DIFF): MEAN CELL HEMOGLOBIN CONCENTRATION: 26.2 G/DL — LOW (ref 33–37)
CBC WITH WBC (DIFF): MEAN CELL HEMOGLOBIN: 24 PG — LOW (ref 27–31)
CBC WITH WBC (DIFF): MEAN CELL VOLUME: 92 FL (ref 84–100)
CBC WITH WBC (DIFF): MEAN PLATELET VOLUME: 12 FL — HIGH (ref 8.3–11.9)
CBC WITH WBC (DIFF): METAMYELOCYTE: NORMAL
CBC WITH WBC (DIFF): MICROCYTOSIS: PRESENT
CBC WITH WBC (DIFF): MIX CELLS: NORMAL
CBC WITH WBC (DIFF): MONOCYTE: 6 % (ref 1–10)
CBC WITH WBC (DIFF): MYELOCYTE: NORMAL
CBC WITH WBC (DIFF): NEUTROPHIL SEGMENTED: 68 % (ref 50–70)
CBC WITH WBC (DIFF): NOTE: NORMAL
CBC WITH WBC (DIFF): NUCLEATED RBC: 0 /100WBC (ref 0–0)
CBC WITH WBC (DIFF): OVALOCYTE: NORMAL
CBC WITH WBC (DIFF): PAPPENHEIMER BODIES: NORMAL
CBC WITH WBC (DIFF): PATHOLOGIST INTERPRETATION: NORMAL
CBC WITH WBC (DIFF): PLATELET CLUMPS: NORMAL
CBC WITH WBC (DIFF): PLATELET COUNT: 313 /NL (ref 140–440)
CBC WITH WBC (DIFF): PLT SATELLITOSIS: NORMAL
CBC WITH WBC (DIFF): POIKILOCYTOSIS: (no result)
CBC WITH WBC (DIFF): POLYCHROMASIA: (no result)
CBC WITH WBC (DIFF): PROMYELOCYTE: NORMAL
CBC WITH WBC (DIFF): RBC MORPHOLOGY: (no result)
CBC WITH WBC (DIFF): REACT LYMPH ABS MAN: NORMAL
CBC WITH WBC (DIFF): REACTIVE LYMPHOCYTE: PRESENT
CBC WITH WBC (DIFF): RED BLOOD CELL: 3.37 /PL — LOW (ref 4.7–6.1)
CBC WITH WBC (DIFF): RED CELL DIAMETER WIDTH: 57 FL — HIGH (ref 35–48)
CBC WITH WBC (DIFF): ROULEAUX RBC: NORMAL
CBC WITH WBC (DIFF): SCHISTOCYTE: NORMAL
CBC WITH WBC (DIFF): SICKLE CELL: NORMAL
CBC WITH WBC (DIFF): SMUDGE CELLS: NORMAL
CBC WITH WBC (DIFF): SPHEROCYTE: (no result)
CBC WITH WBC (DIFF): STOMATOCYTE: NORMAL
CBC WITH WBC (DIFF): TARGET CELL: NORMAL
CBC WITH WBC (DIFF): TEAR DROP CELL: NORMAL
CBC WITH WBC (DIFF): TOXIC GRANULATION: NORMAL
CBC WITH WBC (DIFF): UNCLASSIFIED CELL: NORMAL
CBC WITH WBC (DIFF): VACUOLATED NEUTROPHIL: NORMAL
CBC WITH WBC (DIFF): WBC MORPHOLOGY: NORMAL
CBC WITH WBC (DIFF): WHITE BLOOD CELL: 8.5 /NL (ref 4.8–11)
COMPREHENSIVE METABOLIC PANEL: ALBUMIN: 4.7 G/DL (ref 3.5–4.8)
COMPREHENSIVE METABOLIC PANEL: ALKALINE PHOSPHATASE: 59 UNITS/L (ref 36–126)
COMPREHENSIVE METABOLIC PANEL: ALT: 20 UNITS/L (ref ?–49)
COMPREHENSIVE METABOLIC PANEL: ANION GAP: 18 MMOL/L — HIGH (ref 7–16)
COMPREHENSIVE METABOLIC PANEL: AST: 25 UNITS/L (ref 17–59)
COMPREHENSIVE METABOLIC PANEL: BILIRUBIN, TOTAL: 0.6 MG/DL (ref 0.2–1.3)
COMPREHENSIVE METABOLIC PANEL: BUN/CREATININE RATIO: 15.7
COMPREHENSIVE METABOLIC PANEL: CALCIUM: 12.2 MG/DL — HIGH (ref 8.4–10.2)
COMPREHENSIVE METABOLIC PANEL: CARBON DIOXIDE: 23 MMOL/L (ref 22–30)
COMPREHENSIVE METABOLIC PANEL: CHLORIDE: 100 MMOL/L (ref 98–107)
COMPREHENSIVE METABOLIC PANEL: CREATININE: 1.53 MG/DL — HIGH (ref 0.66–1.25)
COMPREHENSIVE METABOLIC PANEL: GLUCOSE: 146 MG/DL — HIGH (ref 75–110)
COMPREHENSIVE METABOLIC PANEL: POTASSIUM: 4.6 MMOL/L (ref 3.5–5.3)
COMPREHENSIVE METABOLIC PANEL: PROTEIN, TOTAL, SERUM: 7.1 G/DL (ref 6.3–8.2)
COMPREHENSIVE METABOLIC PANEL: SODIUM: 141 MMOL/L (ref 137–145)
COMPREHENSIVE METABOLIC PANEL: UREA NITROGEN (BUN): 24 MG/DL — HIGH (ref 9–20)
FERRITIN: 7.2 NG/ML — LOW (ref 18–464)
GFR: EGFR AFRICAN AMERICAN: 56 ML/MIN/1.73M2 — LOW (ref 60–?)
GFR: EGFR NON-AFR.AMERICAN: 46 ML/MIN/1.73M2 — LOW (ref 60–?)
IRON AND TOTAL IRON BINDING CAPACITY: % SATURATION: 6 % — LOW (ref 11–40)
IRON AND TOTAL IRON BINDING CAPACITY: IRON BINDING CAPACITY: 549 MCG/DL — HIGH (ref 261–462)
IRON AND TOTAL IRON BINDING CAPACITY: IRON, TOTAL: 35 MCG/DL — LOW (ref 49–181)

## 2023-10-27 ENCOUNTER — OFFICE (OUTPATIENT)
Dept: URBAN - NONMETROPOLITAN AREA CLINIC 1 | Facility: CLINIC | Age: 63
End: 2023-10-27
Payer: COMMERCIAL

## 2023-10-27 VITALS
HEIGHT: 76 IN | HEART RATE: 101 BPM | SYSTOLIC BLOOD PRESSURE: 130 MMHG | WEIGHT: 315 LBS | DIASTOLIC BLOOD PRESSURE: 68 MMHG

## 2023-10-27 DIAGNOSIS — D50.9 IRON DEFICIENCY ANEMIA, UNSPECIFIED: ICD-10-CM

## 2023-10-27 DIAGNOSIS — R19.5 OTHER FECAL ABNORMALITIES: ICD-10-CM

## 2023-10-27 DIAGNOSIS — K55.20 ANGIODYSPLASIA OF COLON WITHOUT HEMORRHAGE: ICD-10-CM

## 2023-10-27 DIAGNOSIS — K92.1 MELENA: ICD-10-CM

## 2023-10-27 PROCEDURE — 99215 OFFICE O/P EST HI 40 MIN: CPT | Performed by: INTERNAL MEDICINE

## 2023-11-03 ENCOUNTER — ON CAMPUS - OUTPATIENT (OUTPATIENT)
Dept: URBAN - NONMETROPOLITAN AREA HOSPITAL 34 | Facility: HOSPITAL | Age: 63
End: 2023-11-03
Payer: COMMERCIAL

## 2023-11-03 DIAGNOSIS — D50.9 IRON DEFICIENCY ANEMIA, UNSPECIFIED: ICD-10-CM

## 2023-11-03 DIAGNOSIS — K55.20 ANGIODYSPLASIA OF COLON WITHOUT HEMORRHAGE: ICD-10-CM

## 2023-11-03 DIAGNOSIS — R19.5 OTHER FECAL ABNORMALITIES: ICD-10-CM

## 2023-11-03 PROCEDURE — 45378 DIAGNOSTIC COLONOSCOPY: CPT | Performed by: INTERNAL MEDICINE

## 2023-11-03 PROCEDURE — 44369 SMALL BOWEL ENDOSCOPY: CPT | Mod: 51 | Performed by: INTERNAL MEDICINE

## 2024-02-05 ENCOUNTER — OFFICE (OUTPATIENT)
Dept: URBAN - NONMETROPOLITAN AREA CLINIC 1 | Facility: CLINIC | Age: 64
End: 2024-02-05

## 2024-02-05 VITALS
HEART RATE: 89 BPM | DIASTOLIC BLOOD PRESSURE: 77 MMHG | HEIGHT: 76 IN | SYSTOLIC BLOOD PRESSURE: 154 MMHG | SYSTOLIC BLOOD PRESSURE: 162 MMHG | DIASTOLIC BLOOD PRESSURE: 81 MMHG | WEIGHT: 315 LBS

## 2024-02-05 DIAGNOSIS — K55.20 ANGIODYSPLASIA OF COLON WITHOUT HEMORRHAGE: ICD-10-CM

## 2024-02-05 DIAGNOSIS — D50.9 IRON DEFICIENCY ANEMIA, UNSPECIFIED: ICD-10-CM

## 2024-02-05 PROCEDURE — 99213 OFFICE O/P EST LOW 20 MIN: CPT | Performed by: NURSE PRACTITIONER

## 2024-02-05 NOTE — SERVICEHPINOTES
In to follow-up after procedures.  He has chronic iron-deficiency anemia secondary to small bowel AVMs.   He continues to see Dr. Black, and has an appointment with her tomorrow. He is receiving iron infusions every other month, and blood transfusion every month.  He has an appointment with Dr. Brower at [facility].  
br 
brspan id="{[id number]}" class="narrative freetextSelected" type="freetext" canedit="true" suppressed="false" nid="[id number]" gid="{[id number]}" bound="false" visited="true"EGD/ small bowel enteroscopy and colonoscopy  11/3/23 by Dr. Cowan-
johnny
Findings:brEsophagus: 	Esophagus was normal. GE junction and Z-line at 40 centimeters. No evidence of Arriaga's esophagus or hiatal hernia.brStomach: Normal stomach. No ulcers, tumors or masses.brPylorus: Widely patent.brDuodenum: Normal to the 4th portion the duodenum.brJejunum: Again noted were 5 small AVMs in the jejunum which were eradicated with APC cautery 25 reyes. Otherwise the small bowel appeared normal as far as the scope could go.
brColon findings: The colon was normal. Terminal ileum, and cecum and ileocecal valve were normal. There was no evidence of masses, lesions, polyps, cancers, strictures, diverticulosis, inflammatory or vascular changes. Retroflexion showed a normal anorectal junction. br
br
br Past medical history:brOccult overt GI bleeding.brIron-deficiency anemia.brNormal EGD by Dr. Kingston 2023br03/29/2023 small bowel enteroscopy by Dr. Cowan showed multiple AVMs in the duodenal and jejunum which were cauterizedbrSmall bowel video capsule study 02/07/2023 showed multiple AVMs in the duodenal and jejunum/spanspan id="{[id number]}" class="narrative placeholderNormal" type="placeholder" canedit="true" suppressed="true" text="[ROS Wording]" nid="[id number]" gid="{[id number]}" propertyname="rosWording" displayname="ROS Wording" tooltip="" handler="" handlerdata="" datatype="text" mandatory="false" requires="" allowmultipleentries="" describes="" tagname="" prototype="" dontshowempty="false" empty="true" onmouseover="__NarrativeOnMouseOver('{[id number]}')" onmouseout="__NarrativeOnMouseOut('{[id number]}')" onmousedown="__NarrativePlaceholderClicked('{[id number]}')"/spanspan id="{[id number]}" class="narrative freetextNormal" type="freetext" canedit="true" suppressed="false" nid="[id number]" gid="{[id number]}" bound="false" /span